# Patient Record
Sex: FEMALE | Race: BLACK OR AFRICAN AMERICAN | NOT HISPANIC OR LATINO | Employment: FULL TIME | URBAN - METROPOLITAN AREA
[De-identification: names, ages, dates, MRNs, and addresses within clinical notes are randomized per-mention and may not be internally consistent; named-entity substitution may affect disease eponyms.]

---

## 2018-04-17 ENCOUNTER — HISTORICAL (OUTPATIENT)
Dept: ADMINISTRATIVE | Facility: HOSPITAL | Age: 43
End: 2018-04-17

## 2018-04-20 ENCOUNTER — HISTORICAL (OUTPATIENT)
Dept: RADIOLOGY | Facility: HOSPITAL | Age: 43
End: 2018-04-20

## 2019-04-02 ENCOUNTER — HISTORICAL (OUTPATIENT)
Dept: ADMINISTRATIVE | Facility: HOSPITAL | Age: 44
End: 2019-04-02

## 2019-04-08 ENCOUNTER — HISTORICAL (OUTPATIENT)
Dept: RADIOLOGY | Facility: HOSPITAL | Age: 44
End: 2019-04-08

## 2019-09-20 ENCOUNTER — HISTORICAL (OUTPATIENT)
Dept: ADMINISTRATIVE | Facility: HOSPITAL | Age: 44
End: 2019-09-20

## 2019-09-20 LAB
ABS NEUT (OLG): 3.3 X10(3)/MCL (ref 2.1–9.2)
ALBUMIN SERPL-MCNC: 3.6 GM/DL (ref 3.4–5)
ALBUMIN/GLOB SERPL: 1.33 {RATIO} (ref 1.5–2.5)
ALP SERPL-CCNC: 78 UNIT/L (ref 38–126)
ALT SERPL-CCNC: 14 UNIT/L (ref 7–52)
APPEARANCE, UA: CLEAR
AST SERPL-CCNC: 14 UNIT/L (ref 15–37)
BACTERIA #/AREA URNS AUTO: NORMAL /HPF
BILIRUB SERPL-MCNC: 0.4 MG/DL (ref 0.2–1)
BILIRUB UR QL STRIP: NEGATIVE MG/DL
BILIRUBIN DIRECT+TOT PNL SERPL-MCNC: 0.1 MG/DL (ref 0–0.5)
BILIRUBIN DIRECT+TOT PNL SERPL-MCNC: 0.3 MG/DL
BUN SERPL-MCNC: 15 MG/DL (ref 7–18)
CALCIUM SERPL-MCNC: 9.1 MG/DL (ref 8.5–10)
CHLORIDE SERPL-SCNC: 107 MMOL/L (ref 98–107)
CHOLEST SERPL-MCNC: 160 MG/DL (ref 0–200)
CHOLEST/HDLC SERPL: 4.1 {RATIO}
CO2 SERPL-SCNC: 25 MMOL/L (ref 21–32)
COLOR UR: YELLOW
CREAT SERPL-MCNC: 0.87 MG/DL (ref 0.6–1.3)
ERYTHROCYTE [DISTWIDTH] IN BLOOD BY AUTOMATED COUNT: 13.5 % (ref 11.5–17)
GLOBULIN SER-MCNC: 2.7 GM/DL (ref 1.2–3)
GLUCOSE (UA): NEGATIVE MG/DL
GLUCOSE SERPL-MCNC: 115 MG/DL (ref 74–106)
HCT VFR BLD AUTO: 36.2 % (ref 37–47)
HDLC SERPL-MCNC: 39 MG/DL (ref 35–60)
HGB BLD-MCNC: 12.1 GM/DL (ref 12–16)
HGB UR QL STRIP: NEGATIVE UNIT/L
KETONES UR QL STRIP: NEGATIVE MG/DL
LDLC SERPL CALC-MCNC: 94 MG/DL (ref 0–129)
LEUKOCYTE ESTERASE UR QL STRIP: NEGATIVE UNIT/L
LYMPHOCYTES # BLD AUTO: 3.1 X10(3)/MCL (ref 0.6–3.4)
LYMPHOCYTES NFR BLD AUTO: 45.6 % (ref 13–40)
MCH RBC QN AUTO: 28.4 PG (ref 27–31.2)
MCHC RBC AUTO-ENTMCNC: 33 GM/DL (ref 32–36)
MCV RBC AUTO: 85 FL (ref 80–94)
MONOCYTES # BLD AUTO: 0.5 X10(3)/MCL (ref 0.1–1.3)
MONOCYTES NFR BLD AUTO: 7.1 % (ref 0.1–24)
NEUTROPHILS NFR BLD AUTO: 47.3 % (ref 47–80)
NITRITE UR QL STRIP.AUTO: NEGATIVE
PH UR STRIP: 6 [PH]
PLATELET # BLD AUTO: 262 X10(3)/MCL (ref 130–400)
PMV BLD AUTO: 9.2 FL (ref 9.4–12.4)
POTASSIUM SERPL-SCNC: 4.1 MMOL/L (ref 3.5–5.1)
PROT SERPL-MCNC: 6.3 GM/DL (ref 6.4–8.2)
PROT UR QL STRIP: NEGATIVE MG/DL
RBC # BLD AUTO: 4.26 X10(6)/MCL (ref 4.2–5.4)
RBC #/AREA URNS HPF: NORMAL /HPF
SODIUM SERPL-SCNC: 138 MMOL/L (ref 136–145)
SP GR UR STRIP: 1.02
SQUAMOUS EPITHELIAL, UA: NORMAL /LPF
TRIGL SERPL-MCNC: 87 MG/DL (ref 30–150)
TSH SERPL-ACNC: 3.24 MIU/ML (ref 0.35–4.94)
UROBILINOGEN UR STRIP-ACNC: 0.2 MG/DL
VLDLC SERPL CALC-MCNC: 17.4 MG/DL
WBC # SPEC AUTO: 6.9 X10(3)/MCL (ref 4.5–11.5)
WBC #/AREA URNS AUTO: NORMAL /[HPF]

## 2020-02-18 ENCOUNTER — HISTORICAL (OUTPATIENT)
Dept: ADMINISTRATIVE | Facility: HOSPITAL | Age: 45
End: 2020-02-18

## 2020-02-18 LAB
EST. AVERAGE GLUCOSE BLD GHB EST-MCNC: 123 MG/DL
HBA1C MFR BLD: 5.9 % (ref 4.4–6.4)
T3FREE SERPL-MCNC: 3.04 PG/ML (ref 1.45–3.48)
T4 FREE SERPL-MCNC: 0.96 NG/DL (ref 0.76–1.46)
TSH SERPL-ACNC: 2.25 MIU/ML (ref 0.35–4.94)

## 2020-08-19 ENCOUNTER — HISTORICAL (OUTPATIENT)
Dept: ADMINISTRATIVE | Facility: HOSPITAL | Age: 45
End: 2020-08-19

## 2020-08-19 LAB
BILIRUB SERPL-MCNC: NEGATIVE MG/DL
BLOOD URINE, POC: NORMAL
CLARITY, POC UA: NORMAL
COLOR, POC UA: NORMAL
GLUCOSE UR QL STRIP: NEGATIVE
KETONES UR QL STRIP: NEGATIVE
LEUKOCYTE EST, POC UA: NORMAL
NITRITE, POC UA: POSITIVE
PH, POC UA: 6
PROTEIN, POC: NEGATIVE
SPECIFIC GRAVITY, POC UA: 1.02
UROBILINOGEN, POC UA: NORMAL

## 2020-08-21 LAB — FINAL CULTURE: NO GROWTH

## 2020-08-25 LAB
BILIRUB SERPL-MCNC: NEGATIVE MG/DL
BLOOD URINE, POC: NEGATIVE
CLARITY, POC UA: CLEAR
COLOR, POC UA: YELLOW
GLUCOSE UR QL STRIP: NEGATIVE
KETONES UR QL STRIP: NEGATIVE
LEUKOCYTE EST, POC UA: NORMAL
NITRITE, POC UA: NEGATIVE
PH, POC UA: 6.5
PROTEIN, POC: NEGATIVE
SPECIFIC GRAVITY, POC UA: 1
UROBILINOGEN, POC UA: NORMAL

## 2021-03-01 ENCOUNTER — HISTORICAL (OUTPATIENT)
Dept: ADMINISTRATIVE | Facility: HOSPITAL | Age: 46
End: 2021-03-01

## 2021-03-01 LAB
ABS NEUT (OLG): 3 X10(3)/MCL (ref 2.1–9.2)
ALBUMIN SERPL-MCNC: 3.9 GM/DL (ref 3.4–5)
ALBUMIN/GLOB SERPL: 1.39 {RATIO} (ref 1.5–2.5)
ALP SERPL-CCNC: 79 UNIT/L (ref 38–126)
ALT SERPL-CCNC: 12 UNIT/L (ref 7–52)
AST SERPL-CCNC: 16 UNIT/L (ref 15–37)
BILIRUB SERPL-MCNC: 0.4 MG/DL (ref 0.2–1)
BILIRUBIN DIRECT+TOT PNL SERPL-MCNC: 0.1 MG/DL (ref 0–0.5)
BILIRUBIN DIRECT+TOT PNL SERPL-MCNC: 0.3 MG/DL
BUN SERPL-MCNC: 10 MG/DL (ref 7–18)
CALCIUM SERPL-MCNC: 10.2 MG/DL (ref 8.5–10)
CHLORIDE SERPL-SCNC: 104 MMOL/L (ref 98–107)
CO2 SERPL-SCNC: 24 MMOL/L (ref 21–32)
CREAT SERPL-MCNC: 0.95 MG/DL (ref 0.6–1.3)
CREAT UR-MCNC: 100 MG/DL
DEPRECATED CALCIDIOL+CALCIFEROL SERPL-MC: 19.3 NG/ML (ref 30–80)
ERYTHROCYTE [DISTWIDTH] IN BLOOD BY AUTOMATED COUNT: 13.1 % (ref 11.5–17)
EST CREAT CLEARANCE SER (OHS): 143.88 ML/MIN
EST. AVERAGE GLUCOSE BLD GHB EST-MCNC: 128 MG/DL
GLOBULIN SER-MCNC: 2.8 GM/DL (ref 1.2–3)
GLUCOSE SERPL-MCNC: 116 MG/DL (ref 74–106)
HBA1C MFR BLD: 6.1 % (ref 4.4–6.4)
HCT VFR BLD AUTO: 36.4 % (ref 37–47)
HGB BLD-MCNC: 11.8 GM/DL (ref 12–16)
LYMPHOCYTES # BLD AUTO: 3 X10(3)/MCL (ref 0.6–3.4)
LYMPHOCYTES NFR BLD AUTO: 47 % (ref 13–40)
MCH RBC QN AUTO: 27.5 PG (ref 27–31.2)
MCHC RBC AUTO-ENTMCNC: 32 GM/DL (ref 32–36)
MCV RBC AUTO: 85 FL (ref 80–94)
MICROALBUMIN UR-MCNC: 10 MG/L
MICROALBUMIN/CREAT RATIO PNL UR: <30 MG/GM
MONOCYTES # BLD AUTO: 0.4 X10(3)/MCL (ref 0.1–1.3)
MONOCYTES NFR BLD AUTO: 7 % (ref 0.1–24)
NEUTROPHILS NFR BLD AUTO: 46 % (ref 47–80)
PLATELET # BLD AUTO: 314 X10(3)/MCL (ref 130–400)
PMV BLD AUTO: 9.4 FL (ref 9.4–12.4)
POTASSIUM SERPL-SCNC: 4.6 MMOL/L (ref 3.5–5.1)
PROT SERPL-MCNC: 6.7 GM/DL (ref 6.4–8.2)
RBC # BLD AUTO: 4.29 X10(6)/MCL (ref 4.2–5.4)
SODIUM SERPL-SCNC: 136 MMOL/L (ref 136–145)
T3FREE SERPL-MCNC: 2.93 PG/ML (ref 1.45–3.48)
T4 FREE SERPL-MCNC: 1.05 NG/DL (ref 0.76–1.46)
TSH SERPL-ACNC: 2.77 MIU/ML (ref 0.35–4.94)
WBC # SPEC AUTO: 6.4 X10(3)/MCL (ref 4.5–11.5)

## 2021-03-30 ENCOUNTER — HISTORICAL (OUTPATIENT)
Dept: ADMINISTRATIVE | Facility: HOSPITAL | Age: 46
End: 2021-03-30

## 2022-02-22 ENCOUNTER — HISTORICAL (OUTPATIENT)
Dept: ADMINISTRATIVE | Facility: HOSPITAL | Age: 47
End: 2022-02-22

## 2022-02-22 LAB
PTH-INTACT SERPL-MCNC: 263.4 PG/ML (ref 8.7–77.1)
RHEUMATOID FACT SERPL-ACNC: <13 [IU]/ML

## 2022-02-23 LAB
ANTINUCLEAR ANTIBODY SCREEN (OHS): NEGATIVE
DSDNA AB QUANT (OHS): 2.1
DSDNA ANTIBODY (OHS): NEGATIVE

## 2022-04-10 ENCOUNTER — HISTORICAL (OUTPATIENT)
Dept: ADMINISTRATIVE | Facility: HOSPITAL | Age: 47
End: 2022-04-10
Payer: MEDICAID

## 2022-04-26 VITALS
OXYGEN SATURATION: 97 % | SYSTOLIC BLOOD PRESSURE: 149 MMHG | HEIGHT: 62 IN | DIASTOLIC BLOOD PRESSURE: 83 MMHG | BODY MASS INDEX: 51.48 KG/M2 | WEIGHT: 279.75 LBS

## 2022-05-02 NOTE — HISTORICAL [facility]
This is a historical note converted from [employer]. Formatting and pictures may have been removed.  Please reference [employer] for original formatting and attached multimedia. Chief Complaint  Frequent/painful urination x1 week, H/o recurring UTIs, lower back pain, used AZO cranberry for urinary symptoms 8/18/20  History of Present Illness  44 year old  female presents with urinary frequency, back pain and changes in urinary stream x1 week?. No fever or chills. Denies N/V/D. No genital irritation or vaginal discharge reported. Denies flank pain or blood in urine.  Hx of Total Hysterectomy. Recurrent yeast infections and BV in past.  Review of Systems  ?  CONSTITUTIONAL:?No?fever, chills, weakness or fatigue.?  CARDIOVASCULAR: No chest pain, palpitations or edema.?  RESPIRATORY: No shortness of breath, coughing or wheezing.  GASTROINTESTINAL: No anorexia, nausea, vomiting or diarrhea. No abdominal pain.  GENITOURINARY:?+urinary frequency.?+ pain with urination; no blood in urine. No vaginal?discharge.  NEUROLOGICAL: No headache, dizziness, syncope, paralysis, ataxia, numbness or tingling in the extremities.  MUSCULOSKELETAL: Low back pain.  SKIN: No rash or itching.?  ?   ?  ?  Physical Exam  Vitals & Measurements  T:?36.4? ?C (Oral)? HR:?71(Peripheral)? RR:?20? BP:?140/83? SpO2:?100%?  HT:?158.00?cm? WT:?125.200?kg? BMI:?50.15?  Vital Signs reviewed  CV: Normal rate and rhythm. No murmurs, rubs or gallops.??Normal peripheral pulses and perfusion.  RESP: Respirations even and non labored. BBS clear to auscultation. No accessory muscle use.  GI: Abdomen soft and non distended. No tenderness with palpation.  MUSCULOSKELETAL: Full passive and active ROM of all joints. No bony deformities,?joint tenderness or swelling.?No CVA tenderness.  NEURO: Awake, alert and oriented to person, place, time and situation. No focal or sensory deficits noted.  ?  Assessment/Plan  1.?UTI (urinary tract infection)?N39.0,?  Urine  Dipstick with?trace? blood, small?leukocytes, positive nitrite. ?Urine C&S pending.  Rx for Macrobid 100 mg PO BID x 7 days; Pyridium 200 mg PO TID x 3 days.  Fluconazole 150 mg tablet at completion of antibiotics x 1 dose.  Plenty of water.  Always wipe front to back after voiding or BMs.  ER precautions for fever, N/V, hematuria or worsening in condition.  UTI (urinary tract infection)?N39.0  Ordered:  fluconazole, See Instructions, 1 tab(s) Oral at completion of antibiotics, # 1 tab(s), 0 Refill(s), Pharmacy: [facility] #[id number], 158, cm, Height/Length Dosing, 08/19/20 13:19:00 CDT, 125.2, kg, Weight Dosing, 08/19/20 13:19:00 CDT  nitrofurantoin, 100 mg = 1 cap(s), Oral, BID, X 7 day(s), # 14 cap(s), 0 Refill(s), Pharmacy: [facility] #[id number], 158, cm, Height/Length Dosing, 08/19/20 13:19:00 CDT, 125.2, kg, Weight Dosing, 08/19/20 13:19:00 CDT  Office/Outpatient Visit Level 3 New [id number] PC, UTI (urinary tract infection)  Morbid obesity, 08/19/20 14:26:00 CDT  Urine Culture [id number], Routine collect, 08/19/20 14:12:00 CDT, Urine, Clean Catch, Nurse collect, Stop date 08/19/20 14:13:00 CDT, UTI (urinary tract infection)  ?  2.?Morbid obesity?E66.01  PCP follow up for routine health management.  Ordered:  Office/Outpatient Visit Level 3 New [id number] PC, UTI (urinary tract infection)  Morbid obesity, 08/19/20 14:26:00 CDT  ?   Problem List/Past Medical History  Ongoing  Acute medial meniscal injury of right knee  Chondromalacia patellae, right knee  Iliotibial band syndrome, right leg  Left knee pain  Morbid obesity  Prepatellar bursitis, right knee  Sacroiliitis  Sprain of medial collateral ligament of left knee  Tobacco user  Historical  No qualifying data  Procedure/Surgical History  Colonoscopy (12/19/2019)  Colonoscopy (10/26/2012)  [place] teeth extracted (2008)  History of total vaginal hysterectomy (TVH) (2004)   Medications  Astelin 137 mcg/inh nasal spray, 2 spray(s), Nasal, BID, 5  refills  fluconazole 150 mg oral tablet, See Instructions  levothyroxine 75 mcg (0.075 mg) oral tablet, 75 mcg= 1 tab(s), Oral, Daily, 5 refills  Macrobid 100 mg oral capsule, 100 mg= 1 cap(s), Oral, BID  Pyridium 200 mg oral tablet, 200 mg= 1 tab(s), Oral, TID  Allergies  No Known Allergies  Social History  Abuse/Neglect  No, No, Yes, 08/19/2020  Alcohol  Never, 04/17/2018  Employment/School  Unemployed, 08/27/2019  Home/Environment  Lives with Children., 08/27/2019  Tobacco  Former smoker, quit more than 30 days ago, N/A, 08/19/2020  Family History  Alzheimers disease: Father.  Asthma.: Mother.  Diabetes mellitus type 2: Mother.  Hypertension.: Mother.  Hyperthyroidism.: Sister.  Thyroid disease: Mother.  Sister: History is negative  Sister: History is negative  Immunizations  Vaccine Date Status   influenza virus vaccine, inactivated 09/25/2019 Given   Health Maintenance  Health Maintenance  ???Pending?(in the next year)  ??? ??OverDue  ??? ? ? ?Cervical Cancer Screening due??04/30/17??and every 3??year(s)  ??? ? ? ?Depression Screening due??04/17/19??and every 1??year(s)  ??? ??Due?  ??? ? ? ?Influenza Vaccine due??08/19/20??and every?  ??? ? ? ?Tetanus Vaccine due??08/19/20??and every 10??year(s)  ??? ??Refused?  ??? ? ? ?Smoking Cessation due??01/01/20??and every 1??year(s)  ??? ??Due In Future?  ??? ? ? ?Obesity Screening not due until??01/01/21??and every 1??year(s)  ??? ? ? ?Alcohol Misuse Screening not due until??01/02/21??and every 1??year(s)  ??? ? ? ?Diabetes Screening not due until??02/17/21??and every 1??year(s)  ???Satisfied?(in the past 1 year)  ??? ??Satisfied?  ??? ? ? ?ADL Screening on??08/19/20.??Satisfied by Yousuf Kim  ??? ? ? ?Alcohol Misuse Screening on??08/19/20.??Satisfied by Deanne Larose LPN.  ??? ? ? ?Blood Pressure Screening on??08/19/20.??Satisfied by Yousuf Kim  ??? ? ? ?Body Mass Index Check on??08/19/20.??Satisfied by Yousuf Kim  ??? ? ? ?Diabetes  Screening on??02/18/20.??Satisfied by Sussy Dill  ??? ? ? ?Influenza Vaccine on??09/25/19.??Satisfied by Lana Choudhary LPN  ??? ? ? ?Lipid Screening on??09/20/19.??Satisfied by Robert Fajardo  ??? ? ? ?Obesity Screening on??08/19/20.??Satisfied by Yousuf Kim  ??? ??Refused?  ??? ? ? ?Smoking Cessation on??08/19/20.??Recorded by Deanne Larose LPN??Reason: Patient Refuses  ?  Lab Results  Test Name Test Result Date/Time   Urine Color Urine Dipstick Dark yellow 08/19/2020 13:20 CDT   Urine Appearance Urine Dipstick Slightly cloudy 08/19/2020 13:20 CDT   pH Urine Dipstick 6 08/19/2020 13:20 CDT   Specific Gravity Urine Dipstick 1.020 08/19/2020 13:20 CDT   Blood Urine Dipstick Trace 08/19/2020 13:20 CDT   Glucose Urine Dipstick Negative 08/19/2020 13:20 CDT   Ketones Urine Dipstick Negative 08/19/2020 13:20 CDT   Protein Urine Dipstick Negative 08/19/2020 13:20 CDT   Bilirubin Urine Dipstick Negative 08/19/2020 13:20 CDT   Urobilinogen Urine Dipstick 0.2 mg/dl 08/19/2020 13:20 CDT   Leukocytes Urine Dipstick 1+ Small 08/19/2020 13:20 CDT   Nitrite Urine Dipstick Positive 08/19/2020 13:20 CDT

## 2022-05-02 NOTE — HISTORICAL [facility]
This is a historical note converted from [employer]. Formatting and pictures may have been removed.  Please reference La for original formatting and attached multimedia. Chief Complaint  6 MONTH RECHECK/CCC  History of Present Illness  Patient presents for her 6-month follow-up. CCC for 6 days.  ?  Wasnt?able to keep appt with dietician.? Has modified diet somewhat.  Colonoscopy showed 3 polyps 12-19-19 with Dr Shelley, repeat due 2024. He took her off of NSAID, she is taking Tylenol and glucosamine chondroitin.  Has cut back on nicotine.  Review of Systems  General:??????????????? Patient reports energy level is good.??Denies fever,chills, night sweats, fatigue or weakness.  HEENT:?????????????????? Denies vision changes or eye pain.?  Cardiovascular:??? Denies chest pain, palpitations, dyspnea on exertion, orthopnea.  Respiratory:???????? No cough, wheezing, shortness of breath, or sputum.  GI:????????????????????????? Denies nausea, emesis, constipation, diarrhea, melena, hematochezia or abdominal pain  :??????????????????????? No frequency, urgency, hematuria, discharge, or incontinence  MS:?????????????????????? Denies myalgias, arthralgias, joint effusion, edema, or weakness  Neuro:????????????????? No headaches, numbness in extremities, tingling, dizziness, or weakness  Psych:?????????????????? Denies anxiety, depression, suicidal or homicidal ideations, or irritability  Endo:??????????????????? Denies polyuria, polydipsia, polyphagia  Heme:????????????????? No abnormal bleeding or bruising. No lymph node enlargement or pain.  Physical Exam  Vitals & Measurements  T:?37? ?C (Oral)? HR:?94(Peripheral)? BP:?139/80?  HT:?160?cm? WT:?119.7?kg? BMI:?46.76?  General :?????Well-developed and? nourished, no apparent distress, alert and oriented ?4  HEENT:????? TMs and EACs within normal limits,?nasal mucosa with erythema and clear dischare. OP with erythema but no exudate.  Integument :????? Skin is intact with no  erythema.? No pustules or vesicles.? No rash or scale. No Lymphadenopathy.? No urticaria.? No abnormal nevi  Neck :?????Supple, no lymphadenopathy, no thyromegaly, no bruits, no jugular venous distention  Cardiovascular :?????? Regular rhythm and rate, no murmurs, radial and dorsal pedal pulses 2+ bilaterally  Respiratory :??????Lungs clear to auscultation bilaterally, no wheezes, no crackles, no rhonchi.? Good air movement  Abdomen :?????? ?NABS, soft, nontender, no hepatosplenomegaly, no masses, no guarding or rebound????????????????????????  Ext:??????? No muscle tenderness, joints WNL, FROM, negative SLR, no?CCE  Neuro :? ?????? CN II-XII intact, reflexes 2+ throughout, no motor sensory deficits, negative?cerebellar? tests  Heme :?????? No bruising or petechiae?  Back:??????? no CVAT  Assessment/Plan  1.?Hypothyroid?E03.9  ?We will check a TSH, FT3, and FT4 today. ?He has had no side effects to?levothyroxine. ?Will refill med after lab returns.  2.?Sciatica?M54.30  ?Patient doing well with as needed Tylenol.  3.?Morbid obesity?E66.01  ?Long discussion concerning diet and?need for weight loss.? She will?reschedule her appointment with dietitian.  4.?Sinusitis?J32.9  ?We will administer 2 cc Celestone. ?Prescribed Astelin nasal spray,?Promethazine DM,?and?10 days of Ceftin to use if symptoms fail to improve.  5.?Elevated glucose?R73.09  ?We will check an A1c  Referrals  Clinic Follow-up PRN, 02/18/20 9:09:00 CST, [facility], Future Order   Problem List/Past Medical History  Ongoing  Acute medial meniscal injury of right knee  Chondromalacia patellae, right knee  Iliotibial band syndrome, right leg  Left knee pain  Morbid obesity  Prepatellar bursitis, right knee  Sacroiliitis  Sprain of medial collateral ligament of left knee  Tobacco user  Historical  No qualifying data  Procedure/Surgical History  Colonoscopy (12/19/2019)  Colonoscopy (10/26/2012)  [place] teeth extracted (2008)  History of total vaginal  hysterectomy (TVH) (2004)   Medications  Astelin 137 mcg/inh nasal spray, 2 spray(s), Nasal, BID, 5 refills  cefuroxime 250 mg oral tablet, 250 mg= 1 tab(s), Oral, BID  levothyroxine 75 mcg (0.075 mg) oral tablet, 75 mcg= 1 tab(s), Oral, Daily, 5 refills  Promethazine DM 6.25 mg-15 mg/5 mL oral syrup, 5 mL, Oral, q6hr, PRN  Allergies  No Known Allergies  Social History  Abuse/Neglect  No, 02/18/2020  No, 09/25/2019  No, 08/28/2019  Alcohol  Never, 04/17/2018  Employment/School  Unemployed, 08/27/2019  Home/Environment  Lives with Children., 08/27/2019  Tobacco  Smoker, current status unknown, N/A, 02/18/2020  Smoker, current status unknown, Yes, 09/25/2019  Former smoker, quit more than 30 days ago, N/A, 08/28/2019  Current every day smoker, Cigarettes, 5 per day., 02/21/2018  Family History  Alzheimers disease: Father.  Asthma.: Mother.  Diabetes mellitus type 2: Mother.  Hypertension.: Mother.  Hyperthyroidism.: Sister.  Thyroid disease: Mother.  Sister: History is negative  Sister: History is negative  Immunizations  Vaccine Date Status   influenza virus vaccine, inactivated 09/25/2019 Given   Health Maintenance  Health Maintenance  ???Pending?(in the next year)  ??? ??OverDue  ??? ? ? ?Diabetes Screening due??and every?  ??? ? ? ?Cervical Cancer Screening due??04/30/17??and every 3??year(s)  ??? ? ? ?Depression Screening due??04/17/19??and every 1??year(s)  ??? ? ? ?Smoking Cessation due??01/01/20??and every 1??year(s)  ??? ??Due?  ??? ? ? ?Alcohol Misuse Screening due??01/01/20??and every 1??year(s)  ??? ? ? ?ADL Screening due??02/25/20??and every 1??year(s)  ??? ? ? ?Tetanus Vaccine due??02/25/20??and every 10??year(s)  ??? ??Due In Future?  ??? ? ? ?Obesity Screening not due until??01/01/21??and every 1??year(s)  ??? ? ? ?Blood Pressure Screening not due until??02/17/21??and every 1??year(s)  ??? ? ? ?Body Mass Index Check not due until??02/17/21??and every 1??year(s)  ???Satisfied?(in the past 1 year)  ???  ??Satisfied?  ??? ? ? ?Blood Pressure Screening on??02/18/20.??Satisfied by Nubia Young CMA  ??? ? ? ?Body Mass Index Check on??02/18/20.??Satisfied by Nubia Young CMA.  ??? ? ? ?Diabetes Screening on??02/18/20.??Satisfied by Sussy Dill  ??? ? ? ?Influenza Vaccine on??09/25/19.??Satisfied by Lana Choudhary LPN  ??? ? ? ?Lipid Screening on??09/20/19.??Satisfied by Robert Fajardo  ??? ? ? ?Obesity Screening on??02/18/20.??Satisfied by Nubia Young CMA  ?

## 2022-05-02 NOTE — HISTORICAL [facility]
This is a historical note converted from La. Formatting and pictures may have been removed.  Please reference La for original formatting and attached multimedia. Chief Complaint  right knee pain- 12/2017 patient fell at [employer] and has had pain since  History of Present Illness  This 42-year-old comes in for her right knee and SI joint.? The patient states that she fell in December.? Since that time she has developed a limp?and SI joint problems.? She also gives a description of swelling and true mechanical locking of her right knee.  Review of Systems  Constitutional: No fever, weakness, or fatigue.  Ear/Nose/Mouth/Throat: No nasal congestion or sore throat.  Respiratory: No shortness of breath or cough.  Cardiovascular: No chest pain, palpitations, or peripheral edema.  Gastrointestinal: No nausea, vomiting, or abdominal pain.  Genitourinary: No dysuria.  Musculoskeletal: See current complaints  Integumentary: Negative.  Physical Exam  Vitals & Measurements  HR:?67(Peripheral)? BP:?139/99?  HT:?160?cm? HT:?160?cm? WT:?113.7?kg? WT:?113.7?kg? BMI:?44.41?  Physical examination shows that the patient is primarily tender on her right iliotibial band. ?She has some tenderness at the trochanteric bursa. ?She has no true SI joint pain. ?She is motor and sensory intact.? She has a mildly positive Fabers test and a mildly positive tightness of her iliopsoas as well.? She has no evidence of?radiculopathy.  ?  Examination of her right knee shows that the patient has obvious prepatellar bursitis.? She also has a small joint effusion.? She has a positive medial McMurrays test with provocative testing.? She has no evidence of ligamentous laxity. ?She has good patella tracking.? Range of motion is 5-115?.? She is motor and sensory intact.  ?  Standing AP, lateral, and sunrise view of the right knee shows that the patient has mild narrowing of the medial compartment of the right knee.? Otherwise her x-rays are  negative.  Assessment/Plan  1.?Right knee pain  ? The findings were reviewed with the patient and she expressed understanding. ?I recommended the patient have an MRI of her right knee. ?I believe that her persistent limping is causing her to have right SI joint and iliotibial band trouble.? I will see her back after her MRI is completed.? She is instructed to call if she has any problems prior to her next appointment.  Ordered:  Clinic Follow up, *Est. 04/24/18 3:00:00 CDT, Order for future visit, Right knee pain  Prepatellar bursitis, right knee  Acute medial meniscal injury of right knee  Iliotibial band syndrome, right leg, [facility] [place]  MRI Ext Lower Joint Right W/O Contrast, Routine, *Est. 04/18/18 3:00:00 CDT, Injury, knee & below, None, Ambulatory, Rad Type, Order for future visit, Right knee pain  Prepatellar bursitis, right knee  Acute medial meniscal injury of right knee  Iliotibial band syndrome, right leg, Sched...  Office/Outpatient Visit Level 3 New [id number] PC, Right knee pain  Prepatellar bursitis, right knee  Acute medial meniscal injury of right knee  Iliotibial band syndrome, right leg, [facility] [place], 04/17/18 13:57:00 CDT  XR Knee Right 3 Views, Routine, 04/17/18 13:30:00 CDT, Pain, None, Ambulatory, Rad Type, Right knee pain, Not Scheduled, 04/17/18 13:30:00 CDT  ?  2.?Prepatellar bursitis, right knee  Ordered:  Clinic Follow up, *Est. 04/24/18 3:00:00 CDT, Order for future visit, Right knee pain  Prepatellar bursitis, right knee  Acute medial meniscal injury of right knee  Iliotibial band syndrome, right leg, [facility] [place]  MRI Ext Lower Joint Right W/O Contrast, Routine, *Est. 04/18/18 3:00:00 CDT, Injury, knee & below, None, Ambulatory, Rad Type, Order for future visit, Right knee pain  Prepatellar bursitis, right knee  Acute medial meniscal injury of right knee  Iliotibial band syndrome, right leg, Sched...  Office/Outpatient Visit Level 3 New [id number] PC, Right knee  pain  Prepatellar bursitis, right knee  Acute medial meniscal injury of right knee  Iliotibial band syndrome, right leg, [facility] [place], 04/17/18 13:57:00 CDT  ?  3.?Acute medial meniscal injury of right knee  Ordered:  Clinic Follow up, *Est. 04/24/18 3:00:00 CDT, Order for future visit, Right knee pain  Prepatellar bursitis, right knee  Acute medial meniscal injury of right knee  Iliotibial band syndrome, right leg, [facility] [place]  MRI Ext Lower Joint Right W/O Contrast, Routine, *Est. 04/18/18 3:00:00 CDT, Injury, knee & below, None, Ambulatory, Rad Type, Order for future visit, Right knee pain  Prepatellar bursitis, right knee  Acute medial meniscal injury of right knee  Iliotibial band syndrome, right leg, Sched...  Office/Outpatient Visit Level 3 New [id number] PC, Right knee pain  Prepatellar bursitis, right knee  Acute medial meniscal injury of right knee  Iliotibial band syndrome, right leg, [facility] [place], 04/17/18 13:57:00 CDT  ?  4.?Iliotibial band syndrome, right leg  Ordered:  Clinic Follow up, *Est. 04/24/18 3:00:00 CDT, Order for future visit, Right knee pain  Prepatellar bursitis, right knee  Acute medial meniscal injury of right knee  Iliotibial band syndrome, right leg, [facility] [place]  MRI Ext Lower Joint Right W/O Contrast, Routine, *Est. 04/18/18 3:00:00 CDT, Injury, knee & below, None, Ambulatory, Rad Type, Order for future visit, Right knee pain  Prepatellar bursitis, right knee  Acute medial meniscal injury of right knee  Iliotibial band syndrome, right leg, Sched...  Office/Outpatient Visit Level 3 New [id number] PC, Right knee pain  Prepatellar bursitis, right knee  Acute medial meniscal injury of right knee  Iliotibial band syndrome, right leg, [facility] [place], 04/17/18 13:57:00 CDT  ?   Problem List/Past Medical History  Ongoing  Acute medial meniscal injury of right knee  Iliotibial band syndrome, right leg  Morbid obesity  Prepatellar bursitis,  right knee  Tobacco user  Historical  No qualifying data  Medications  Flexeril 5 mg oral tablet, 5 mg= 1 tab(s), Oral, TID  Allergies  No Known Allergies  Social History  Alcohol  Never, 04/17/2018  Tobacco  Current every day smoker, Cigarettes, 5 per day., 02/21/2018

## 2022-05-02 NOTE — HISTORICAL [facility]
This is a historical note converted from [place]. Formatting and pictures may have been removed.  Please reference [place] for original formatting and attached multimedia. Chief Complaint  Left knee pain. Pt states its like a popping pain.  History of Present Illness  This 43-year-old comes in complaining of left knee pain.? I saw her last year for right knee pain and?sacroiliitis.? She had scheduled for?a right SI joint injection but never returned for paperwork or injection.? She states that she still has some right knee pain as well as right SI joint pain but she has learned to live with it.? She gives a good description of meniscal pathology on the left knee.  Review of Systems  Constitutional: No fever, weakness, or fatigue.  Ear/Nose/Mouth/Throat: No nasal congestion or sore throat.  Respiratory: No shortness of breath or cough.  Cardiovascular: No chest pain, palpitations, or peripheral edema.  Gastrointestinal: No nausea, vomiting, or abdominal pain.  Genitourinary: No dysuria.  Musculoskeletal: See current complaints; multiple joint complaints  Integumentary: Negative.  Physical Exam  Vitals & Measurements  HR:?64(Peripheral)? BP:?128/80?  HT:?160?cm? WT:?113.7?kg? BMI:?44.41?  Physical examination shows that the patient walks with an antalgic gait.? She has a moderate effusion in her left knee. ?She is exquisitely tender on the medial joint line of her left knee.? She has a positive medial McMurrays test. ?She has no evidence of ligamentous laxity.? She has good patella tracking. ?She has mild patellofemoral crepitance.? She is motor and sensory intact.  ?  Standing AP, lateral,?sunrise view?and?tunnel view of the left knee shows that?the patient has mild narrowing of the medial compartment of both knees. ?These are KL grade 2 changes.? She has subchondral sclerosis in the medial tibial plateau bilaterally and a small medial femoral osteophyte on the right.  Assessment/Plan  1.?Derangement of medial meniscus  of left knee  ? The findings were reviewed with the patient and she expressed understanding.? We discussed options for treatment. ?The patient would like an MRI to rule out meniscal pathology.? I will see her back after her MRI is completed.? She is instructed to call if she has any problems prior to her next appointment.  Ordered:  1034F Current tobacco smoker:, 04/02/19 16:12:00 CDT  1125F Pain severity quantified, pain present, 04/02/19 16:12:00 CDT  3008F Body Mass Index (BMI), documented, 04/02/19 16:12:00 CDT  3074F Most recent systolic blood pressure, less than 130 mm Hg, 04/02/19 16:12:00 CDT  3079F Most recent diastolic blood pressure, 80 - 89 mm Hg, 04/02/19 16:12:00 CDT  Clinic Follow up, *Est. 04/09/19 3:00:00 CDT, Order for future visit, Derangement of medial meniscus of left knee  Left knee pain, Orthopaedics  MRI Ext Lower Joint Left W/O Contrast, Routine, *Est. 04/08/19 15:45:00 CDT, Injury, knee & below, M23.304 & M25.562; Other meniscus derangements, unspecified medial meniscus, left knee & Pain in left knee, None, Ambulatory, Rad Type, Order for future visit, Derangement of medial meniscus...  Office/Outpatient Visit Level 3 Established [facility], Derangement of medial meniscus of left knee  Left knee pain, [facility], 04/02/19 16:12:00 CDT  ?  2.?Left knee pain  Ordered:  1034F Current tobacco smoker:, 04/02/19 16:12:00 CDT  1125F Pain severity quantified, pain present, 04/02/19 16:12:00 CDT  3008F Body Mass Index (BMI), documented, 04/02/19 16:12:00 CDT  3074F Most recent systolic blood pressure, less than 130 mm Hg, 04/02/19 16:12:00 CDT  3079F Most recent diastolic blood pressure, 80 - 89 mm Hg, 04/02/19 16:12:00 CDT  Clinic Follow up, *Est. 04/09/19 3:00:00 CDT, Order for future visit, Derangement of medial meniscus of left knee  Left knee pain, [facility]  MRI Ext Lower Joint Left W/O Contrast, Routine, *Est. 04/08/19 15:45:00 CDT, Injury, knee & below, M23.304 & M25.562;  Other meniscus derangements, unspecified medial meniscus, left knee & Pain in left knee, None, Ambulatory, Rad Type, Order for future visit, Derangement of medial meniscus...  Office/Outpatient Visit Level 3 Established [facility], Derangement of medial meniscus of left knee  Left knee pain, [facility] Clinic, 04/02/19 16:12:00 CDT  ?   Problem List/Past Medical History  Ongoing  Acute medial meniscal injury of right knee  Chondromalacia patellae, right knee  Iliotibial band syndrome, right leg  Left knee pain  Morbid obesity  Prepatellar bursitis, right knee  Sacroiliitis  Tobacco user  Historical  No qualifying data  Medications  No active medications  Allergies  No Known Allergies  Social History  Alcohol  Never, 04/17/2018  Tobacco  5-9 cigarettes (between 1/4 to 1/2 pack)/day in last 30 days, No, 04/02/2019  Current every day smoker, Cigarettes, 5 per day., 02/21/2018  Health Maintenance  Health Maintenance  ???Pending?(in the next year)  ??? ??OverDue  ??? ? ? ?Cervical Cancer Screening due??04/30/17??and every 3??year(s)  ??? ??Due?  ??? ? ? ?ADL Screening due??04/03/19??and every 1??year(s)  ??? ? ? ?Alcohol Misuse Screening due??04/03/19??and every 1??year(s)  ??? ? ? ?Diabetes Screening due??04/03/19??and every?  ??? ? ? ?Lipid Screening due??04/03/19??and every?  ??? ? ? ?Tetanus Vaccine due??04/03/19??and every 10??year(s)  ??? ??Due In Future?  ??? ? ? ?Depression Screening not due until??04/17/19??and every 1??year(s)  ??? ? ? ?Smoking Cessation not due until??05/23/19??and every 1??year(s)  ??? ? ? ?Blood Pressure Screening not due until??04/01/20??and every 1??year(s)  ??? ? ? ?Body Mass Index Check not due until??04/01/20??and every 1??year(s)  ??? ? ? ?Obesity Screening not due until??04/02/20??and every 1??year(s)  ???Satisfied?(in the past 1 year)  ??? ??Satisfied?  ??? ? ? ?Blood Pressure Screening on??04/02/19.??Satisfied by Colette Moncada  ??? ? ? ?Body Mass Index Check  on??04/02/19.??Satisfied by Colette Moncada  ??? ? ? ?Depression Screening on??04/17/18.??Satisfied by Shreya Danielson  ??? ? ? ?Obesity Screening on??04/02/19.??Satisfied by Colette Moncada  ??? ? ? ?Smoking Cessation on??05/23/18.??Satisfied by Ayden Nance  ?  ?

## 2022-05-02 NOTE — HISTORICAL [facility]
This is a historical note converted from [place]. Formatting and pictures may have been removed.  Please reference [place] for original formatting and attached multimedia. Chief Complaint  BACK PAIN  History of Present Illness  Patient with low back pain for 2 weeks. Radiates to legs, burns more on right side.  also gets pain in neck.  Has tried Tylenol Arthritis with only partial help.  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  Review of Systems  General:??????????????? Patient reports energy level is fair.??Denies fever,chills, night sweats, fatigue or weakness.  HEENT:?????????????????? Denies vision changes or eye pain.? No sore throat, ear pain, sinus pressure or discharge.  Cardiovascular:??? Denies chest pain, palpitations, dyspnea on exertion, orthopnea.  Respiratory:???????? No cough, wheezing, shortness of breath, or sputum.  GI:????????????????????????? Denies nausea, emesis, constipation, diarrhea, melena, hematochezia or abdominal pain  :??????????????????????? No frequency, urgency, hematuria, discharge, or incontinence  MS:?????????????????????? See HPI  Neuro:????????????????? See HPI, no weakness in legs.  Psych:?????????????????? Denies anxiety, depression, suicidal or homicidal ideations, or irritability  Endo:??????????????????? Denies polyuria, polydipsia, polyphagia  Heme:????????????????? No abnormal bleeding or bruising. No lymph node enlargement or pain.  Physical Exam  Vitals & Measurements  HR:?89(Peripheral)? BP:?149/83?  HT:?158?cm? HT:?158.00?cm? WT:?126.9?kg? WT:?126.900?kg? BMI:?50.83?  General: Patient is alert and oriented, morbidly obese  Neck:?Trapezius muscle spasm, tender with range of motion.??No LAD, no masses, no carotid bruits  CV:?Regular rate and rhythm, no murmur  Lungs: CTA B  ABD: Benign  Back:?Lumbar muscle spasm, tender with range of motion.  Neuro:?Intact bilateral lower extremities, negative straight leg raise today.  EXT: tender in each hip with external  rotation.? 2+ DP and radial pulses bilaterally. ?No CCE. ?No LAD.  ?  XR lumbar spine: Normal  XR left hip: OA changes  XR right hip: OA changes  XR cervical spine: Mild loss of lordotic curve, no significant disc disease appreciated  Assessment/Plan  1.?Sciatica?M54.30  Prescribe meloxicam 15 mg, Flexeril 5 mg, and physical therapy  2.?Hip pain?M25.559  3.?Neck pain?M54.2  4.?Morbid obesity?E66.01  ?Patient agrees to a dietitian referral  5.?Prediabetes?R73.03  ?See #4.  Referrals  External Referral, Dietary Counseling, Dietician, Barbara Shi, 03/30/21 15:54:00 CDT, Morbid obesity  Prediabetes  Clinic Follow-up PRN, 03/30/21 15:57:00 CDT, [facility], Future Order   Problem List/Past Medical History  Ongoing  Acute medial meniscal injury of right knee  Chondromalacia patellae, right knee  Iliotibial band syndrome, right leg  Left knee pain  Morbid obesity  Prepatellar bursitis, right knee  Sacroiliitis  Sprain of medial collateral ligament of left knee  Tobacco user  Historical  No qualifying data  Procedure/Surgical History  Colonoscopy (12/19/2019)  Colonoscopy (10/26/2012)  [place] teeth extracted (2008)  History of total vaginal hysterectomy (TVH) (2004)  corpal tunnel sx x 2  hysterectomy  wisdom teeth extraction   Medications  Astelin 137 mcg/inh nasal spray, 2 spray(s), Nasal, BID, 5 refills  Flexeril 5 mg oral tablet, See Instructions, 1 refills  levothyroxine 75 mcg (0.075 mg) oral tablet, 75 mcg= 1 tab(s), Oral, Daily, 1 refills  meloxicam 15 mg oral tablet, 15 mg= 1 tab(s), Oral, Daily, 5 refills  Physical Therapy, See Instructions  Vitamin D3 50,000 intl units (1250 mcg) oral capsule, 85998 IntUnit= 1 cap(s), Oral, qWeek, 2 refills  Allergies  No Known Allergies  Social History  Abuse/Neglect  No, 03/30/2021  No, 03/01/2021  No, 08/25/2020  Alcohol  Never, 04/17/2018  Employment/School  Unemployed, 08/27/2019  Home/Environment  Lives with Children., 08/27/2019  Tobacco  Former smoker, quit more  than 30 days ago, No, 03/30/2021  Former smoker, quit more than 30 days ago, No, 03/01/2021  Former smoker, quit more than 30 days ago, No, 08/25/2020  Family History  Alzheimers disease: Father.  Asthma.: Mother.  Diabetes mellitus type 2: Mother.  Hypertension.: Mother.  Hyperthyroidism.: Sister.  Thyroid disease: Mother.  Sister: History is negative  Sister: History is negative  Immunizations  Vaccine Date Status   COVID-19 MRNA, LNP-S, PF- Pfizer 03/16/2021 Given   influenza virus vaccine, inactivated 09/25/2019 Given   Health Maintenance  Health Maintenance  ???Pending?(in the next year)  ??? ??OverDue  ??? ? ? ?Cervical Cancer Screening due??04/30/17??and every 3??year(s)  ??? ? ? ?Depression Screening due??04/17/19??and every 1??year(s)  ??? ? ? ?Influenza Vaccine due??10/01/20??and every 1??day(s)  ??? ? ? ?Alcohol Misuse Screening due??01/02/21??and every 1??year(s)  ??? ??Due?  ??? ? ? ?Tetanus Vaccine due??04/05/21??and every 10??year(s)  ??? ??Refused?  ??? ? ? ?Smoking Cessation due??01/01/21??and every 1??year(s)  ??? ??Due In Future?  ??? ? ? ?ADL Screening not due until??08/19/21??and every 1??year(s)  ??? ? ? ?Obesity Screening not due until??01/01/22??and every 1??year(s)  ??? ? ? ?Diabetes Screening not due until??03/01/22??and every 1??year(s)  ??? ? ? ?Blood Pressure Screening not due until??03/30/22??and every 1??year(s)  ??? ? ? ?Body Mass Index Check not due until??03/30/22??and every 1??year(s)  ???Satisfied?(in the past 1 year)  ??? ??Satisfied?  ??? ? ? ?ADL Screening on??08/19/20.??Satisfied by Yousuf Kim  ??? ? ? ?Alcohol Misuse Screening on??08/19/20.??Satisfied by Deanne Larose LPN.  ??? ? ? ?Blood Pressure Screening on??03/30/21.??Satisfied by Nubia Young CMA  ??? ? ? ?Body Mass Index Check on??03/30/21.??Satisfied by Nubia Young CMA  ??? ? ? ?Diabetes Screening on??03/01/21.??Satisfied by Shannon Hargrove  ??? ? ? ?Influenza Vaccine  on??03/01/21.??Satisfied by Nubai Young CMA  ??? ? ? ?Obesity Screening on??03/30/21.??Satisfied by Nubia Young CMA  ??? ??Refused?  ??? ? ? ?Smoking Cessation on??08/19/20.??Recorded by Deanne Larose LPN??Reason: Patient Refuses  ?

## 2022-05-02 NOTE — HISTORICAL [facility]
This is a historical note converted from [place]. Formatting and pictures may have been removed.  Please reference [place] for original formatting and attached multimedia. Chief Complaint  MED RECHECK  History of Present Illness  Patient with prediabetes and hypothyroidism. ?Her?last appointment with us was 2/18/2020.  Negative COVID test last week. Tested because she is exhausted. Working 60 hours a week.  Nicotine 1/2 ppd.? No exercise.? Working 2 jobs, both involve medical coding.  Has not had Flu vaccine, wants to wait until after COVID vaccine.  Review of Systems  General:??????????????? Fatigue  HEENT:?????????????????? Denies vision changes or eye pain.? No sore throat, ear pain, sinus pressure or discharge.  Cardiovascular:??? Denies chest pain, palpitations, dyspnea on exertion, orthopnea.  Respiratory:???????? No cough, wheezing, shortness of breath, or sputum.  GI:????????????????????????? Denies nausea, emesis, constipation, diarrhea, melena, hematochezia or abdominal pain  :??????????????????????? No frequency, urgency, hematuria, discharge, or incontinence  MS:?????????????????????? Denies myalgias, arthralgias, joint effusion, edema, or weakness  Neuro:????????????????? No headaches, numbness in extremities, tingling, dizziness, or weakness  Psych:?????????????????? Denies anxiety, depression, suicidal or homicidal ideations, or irritability  Endo:??????????????????? Denies polyuria, polydipsia, polyphagia  Heme:????????????????? No abnormal bleeding or bruising. No lymph node enlargement or pain.  Physical Exam  Vitals & Measurements  HR:?76(Peripheral)? BP:?128/78?  HT:?158?cm? HT:?158.00?cm? WT:?124.5?kg? WT:?124.500?kg? BMI:?49.87?  General :?????Well-developed , modbidly obese femalie in no apparent distress, alert and oriented ?4  Neck :?????Supple, no lymphadenopathy, no thyromegaly, no bruits, no jugular venous distention  Cardiovascular :?????? Regular rhythm and rate, no murmurs, radial and  dorsal pedal pulses 2+ bilaterally  Respiratory :??????Lungs clear to auscultation bilaterally, no wheezes, no crackles, no rhonchi.? Good air movement  Abdomen :?????? ?NABS, soft, nontender, no hepatosplenomegaly, no masses, no guarding or rebound????????????????????????  MS :??????? No muscle tenderness, joints WNL, FROM, negative SLR, no?CCE  Neuro :? ?Grossly intact  Heme :?????? No bruising or petechiae?  Back:??????? no CVAT  Assessment/Plan  1.?Hypothyroid?E03.9  ?Will check her levels and call her with the results. Will wait on refilling levothyroxine.  2.?Prediabetes?R73.03  ?We will check an A1c and microalbumin  3.?Fatigue?R53.83  ?We will check a vitamin D level  4.?Nicotine dependence?F17.200  ?Instructed on adverse health consequences of nicotine use.? Educated on?ways to quit, including?pharmaceutical regimens.  5.?Morbid obesity?E66.01  ?Long-term adverse consequence of obesity discussed with patient at length, she is strongly encouraged to lose weight.   Problem List/Past Medical History  Ongoing  Acute medial meniscal injury of right knee  Chondromalacia patellae, right knee  Iliotibial band syndrome, right leg  Left knee pain  Morbid obesity  Prepatellar bursitis, right knee  Sacroiliitis  Sprain of medial collateral ligament of left knee  Tobacco user  Historical  No qualifying data  Procedure/Surgical History  Colonoscopy (12/19/2019)  Colonoscopy (10/26/2012)  [place] teeth extracted (2008)  History of total vaginal hysterectomy (TVH) (2004)  corpal tunnel sx x 2  hysterectomy  wisdom teeth extraction   Medications  Astelin 137 mcg/inh nasal spray, 2 spray(s), Nasal, BID, 5 refills  levothyroxine 75 mcg (0.075 mg) oral tablet, See Instructions  Allergies  No Known Allergies  Social History  Abuse/Neglect  No, 03/01/2021  No, 08/25/2020  Alcohol  Never, 04/17/2018  Employment/School  Unemployed, 08/27/2019  Home/Environment  Lives with Children., 08/27/2019  Tobacco  Former smoker, quit more  than 30 days ago, No, 03/01/2021  Former smoker, quit more than 30 days ago, No, 08/25/2020  Family History  Alzheimers disease: Father.  Asthma.: Mother.  Diabetes mellitus type 2: Mother.  Hypertension.: Mother.  Hyperthyroidism.: Sister.  Thyroid disease: Mother.  Sister: History is negative  Sister: History is negative  Immunizations  Vaccine Date Status   influenza virus vaccine, inactivated 09/25/2019 Given   Health Maintenance  Health Maintenance  ???Pending?(in the next year)  ??? ??OverDue  ??? ? ? ?Cervical Cancer Screening due??04/30/17??and every 3??year(s)  ??? ? ? ?Depression Screening due??04/17/19??and every 1??year(s)  ??? ? ? ?Influenza Vaccine due??10/01/20??and every 1??day(s)  ??? ? ? ?Alcohol Misuse Screening due??01/02/21??and every 1??year(s)  ??? ??Due?  ??? ? ? ?Tetanus Vaccine due??03/07/21??and every 10??year(s)  ??? ??Refused?  ??? ? ? ?Smoking Cessation due??01/01/21??and every 1??year(s)  ??? ??Due In Future?  ??? ? ? ?ADL Screening not due until??08/19/21??and every 1??year(s)  ??? ? ? ?Obesity Screening not due until??01/01/22??and every 1??year(s)  ??? ? ? ?Blood Pressure Screening not due until??03/01/22??and every 1??year(s)  ??? ? ? ?Body Mass Index Check not due until??03/01/22??and every 1??year(s)  ??? ? ? ?Diabetes Screening not due until??03/01/22??and every 1??year(s)  ???Satisfied?(in the past 1 year)  ??? ??Satisfied?  ??? ? ? ?ADL Screening on??08/19/20.??Satisfied by Yousuf Kim  ??? ? ? ?Alcohol Misuse Screening on??08/19/20.??Satisfied by Deanne Larose LPN  ??? ? ? ?Blood Pressure Screening on??03/01/21.??Satisfied by Nubia Young CMA  ??? ? ? ?Body Mass Index Check on??03/01/21.??Satisfied by Nubia Young CMA  ??? ? ? ?Diabetes Screening on??03/01/21.??Satisfied by Shannon Hargrove  ??? ? ? ?Influenza Vaccine on??03/01/21.??Satisfied by Nubia Young CMA  ??? ? ? ?Obesity Screening on??03/01/21.??Satisfied by Nubia Young CMA  S.  ??? ??Refused?  ??? ? ? ?Smoking Cessation on??08/19/20.??Recorded by Deanne Larose LPN??Reason: Patient Refuses  ?

## 2022-05-25 ENCOUNTER — HOSPITAL ENCOUNTER (EMERGENCY)
Facility: HOSPITAL | Age: 47
Discharge: HOME | End: 2022-05-26
Attending: STUDENT IN AN ORGANIZED HEALTH CARE EDUCATION/TRAINING PROGRAM
Payer: COMMERCIAL

## 2022-05-25 DIAGNOSIS — S39.012A BACK STRAIN, INITIAL ENCOUNTER: ICD-10-CM

## 2022-05-25 DIAGNOSIS — M54.10 RADICULAR PAIN: ICD-10-CM
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
## 2022-05-25 DIAGNOSIS — S39.012A STRAIN OF LUMBAR REGION, INITIAL ENCOUNTER: ICD-10-CM

## 2022-05-25 PROCEDURE — 99285 EMERGENCY DEPT VISIT HI MDM: CPT | Mod: 25

## 2022-05-25 RX ORDER — KETOROLAC TROMETHAMINE 30 MG/ML
60 INJECTION, SOLUTION INTRAMUSCULAR; INTRAVENOUS
Status: COMPLETED | OUTPATIENT
Start: 2022-05-26 | End: 2022-05-26

## 2022-05-25 RX ORDER — ORPHENADRINE CITRATE 100 MG/1
100 TABLET, EXTENDED RELEASE ORAL 2 TIMES DAILY
Status: DISCONTINUED | OUTPATIENT
Start: 2022-05-26 | End: 2022-05-26 | Stop reason: HOSPADM

## 2022-05-25 RX ORDER — ACETAMINOPHEN 500 MG
1000 TABLET ORAL
Status: COMPLETED | OUTPATIENT
Start: 2022-05-26 | End: 2022-05-26
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
## 2022-05-26 VITALS
BODY MASS INDEX: 50.31 KG/M2 | RESPIRATION RATE: 18 BRPM | HEART RATE: 67 BPM | OXYGEN SATURATION: 100 % | WEIGHT: 276.88 LBS | DIASTOLIC BLOOD PRESSURE: 97 MMHG | TEMPERATURE: 98 F | SYSTOLIC BLOOD PRESSURE: 160 MMHG

## 2022-05-26 PROCEDURE — 25000003 PHARM REV CODE 250: Performed by: STUDENT IN AN ORGANIZED HEALTH CARE EDUCATION/TRAINING PROGRAM

## 2022-05-26 PROCEDURE — 96372 THER/PROPH/DIAG INJ SC/IM: CPT | Performed by: STUDENT IN AN ORGANIZED HEALTH CARE EDUCATION/TRAINING PROGRAM

## 2022-05-26 PROCEDURE — 63600175 PHARM REV CODE 636 W HCPCS: Performed by: STUDENT IN AN ORGANIZED HEALTH CARE EDUCATION/TRAINING PROGRAM

## 2022-05-26 RX ORDER — HYDROCODONE BITARTRATE AND ACETAMINOPHEN 5; 325 MG/1; MG/1
1 TABLET ORAL EVERY 12 HOURS PRN
Qty: 9 TABLET | Refills: 0 | Status: SHIPPED | OUTPATIENT
Start: 2022-05-26 | End: 2022-05-31

## 2022-05-26 RX ORDER — IBUPROFEN 600 MG/1
600 TABLET ORAL EVERY 8 HOURS PRN
Qty: 15 TABLET | Refills: 0 | Status: SHIPPED | OUTPATIENT
Start: 2022-05-26 | End: 2022-05-31

## 2022-05-26 RX ORDER — DICLOFENAC SODIUM 10 MG/G
2 GEL TOPICAL 4 TIMES DAILY
Qty: 100 G | Refills: 0 | Status: SHIPPED | OUTPATIENT
Start: 2022-05-26 | End: 2022-11-18

## 2022-05-26 RX ORDER — METHOCARBAMOL 750 MG/1
1500 TABLET, FILM COATED ORAL 3 TIMES DAILY
Qty: 30 TABLET | Refills: 0 | Status: SHIPPED | OUTPATIENT
Start: 2022-05-26 | End: 2022-05-31

## 2022-05-26 RX ORDER — LIDOCAINE 560 MG/1
1 PATCH PERCUTANEOUS; TOPICAL; TRANSDERMAL DAILY
Qty: 10 PATCH | Refills: 0 | Status: SHIPPED | OUTPATIENT
Start: 2022-05-26 | End: 2022-06-05

## 2022-05-26 RX ADMIN — ACETAMINOPHEN 1000 MG: 500 TABLET ORAL at 12:05

## 2022-05-26 RX ADMIN — ORPHENADRINE CITRATE 100 MG: 100 TABLET, EXTENDED RELEASE ORAL at 01:05

## 2022-05-26 RX ADMIN — KETOROLAC TROMETHAMINE 60 MG: 60 INJECTION, SOLUTION INTRAMUSCULAR at 12:05

## 2022-05-26 NOTE — DISCHARGE INSTRUCTIONS
Follow-up with primary care physician.    Take medications as prescribed.    Return to the emergency department for any worsening pain, difficulty breathing, nausea, vomiting, loss of bladder or bowel function, weakness, numbness fever, or any symptoms.
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
## 2022-09-13 PROBLEM — E55.9 VITAMIN D DEFICIENCY: Status: ACTIVE | Noted: 2022-09-13

## 2022-09-13 PROBLEM — E66.01 CLASS 3 SEVERE OBESITY IN ADULT: Status: ACTIVE | Noted: 2022-09-13

## 2022-09-13 PROBLEM — E66.813 CLASS 3 SEVERE OBESITY IN ADULT: Status: ACTIVE | Noted: 2022-09-13

## 2022-09-13 PROBLEM — E66.01 OBESITIES, MORBID: Status: ACTIVE | Noted: 2022-09-13

## 2022-09-13 PROBLEM — E03.9 HYPOTHYROIDISM: Status: ACTIVE | Noted: 2022-09-13

## 2022-09-13 PROBLEM — R73.03 PREDIABETES: Status: ACTIVE | Noted: 2022-09-13

## 2022-09-13 PROBLEM — E66.01 OBESITIES, MORBID: Status: RESOLVED | Noted: 2022-09-13 | Resolved: 2022-09-13

## 2022-09-13 PROBLEM — M85.80 OSTEOPENIA: Status: ACTIVE | Noted: 2022-09-13

## 2022-09-16 ENCOUNTER — HISTORICAL (OUTPATIENT)
Dept: ADMINISTRATIVE | Facility: HOSPITAL | Age: 47
End: 2022-09-16
Payer: MEDICAID

## 2022-11-18 PROBLEM — Z72.0 TOBACCO USER: Status: ACTIVE | Noted: 2022-11-18

## 2022-11-18 PROBLEM — F41.1 ANXIETY STATE: Status: ACTIVE | Noted: 2022-11-18

## 2022-11-18 PROBLEM — F32.9 MAJOR DEPRESSIVE DISORDER WITH SINGLE EPISODE: Status: ACTIVE | Noted: 2022-11-18

## 2022-11-18 PROCEDURE — 87070 CULTURE OTHR SPECIMN AEROBIC: CPT | Performed by: NURSE PRACTITIONER

## 2023-01-11 PROBLEM — D48.5 NEOPLASM OF UNCERTAIN BEHAVIOR OF SKIN: Status: ACTIVE | Noted: 2023-01-11

## 2023-01-11 PROBLEM — R22.2 MASS OF SUBCUTANEOUS TISSUE OF BACK: Status: ACTIVE | Noted: 2023-01-11

## 2023-04-04 ENCOUNTER — PATIENT MESSAGE (OUTPATIENT)
Dept: RESEARCH | Facility: HOSPITAL | Age: 48
End: 2023-04-04
Payer: COMMERCIAL

## 2023-04-06 PROBLEM — G47.33 OSA ON CPAP: Status: ACTIVE | Noted: 2023-04-06

## 2023-06-14 ENCOUNTER — HOSPITAL ENCOUNTER (OUTPATIENT)
Dept: RADIOLOGY | Facility: HOSPITAL | Age: 48
Discharge: HOME | End: 2023-06-14
Payer: COMMERCIAL

## 2023-06-14 DIAGNOSIS — Z12.31 BREAST CANCER SCREENING BY MAMMOGRAM: ICD-10-CM

## 2023-06-15 ENCOUNTER — HOSPITAL ENCOUNTER (OUTPATIENT)
Dept: RADIOLOGY | Facility: HOSPITAL | Age: 48
Discharge: HOME | End: 2023-06-15
Payer: COMMERCIAL

## 2023-06-15 DIAGNOSIS — R92.8 ABNORMAL MAMMOGRAM: ICD-10-CM

## 2023-06-15 PROCEDURE — 77066 MAMMO DIGITAL DIAGNOSTIC BILAT WITH TOMO: ICD-10-PCS | Mod: 26,,, | Performed by: RADIOLOGY

## 2023-06-15 PROCEDURE — 77062 BREAST TOMOSYNTHESIS BI: CPT | Mod: 26,,, | Performed by: RADIOLOGY

## 2023-06-15 PROCEDURE — 77066 DX MAMMO INCL CAD BI: CPT | Mod: TC

## 2023-06-15 PROCEDURE — 76641 ULTRASOUND BREAST COMPLETE: CPT | Mod: TC,RT

## 2023-06-15 PROCEDURE — 77066 DX MAMMO INCL CAD BI: CPT | Mod: 26,,, | Performed by: RADIOLOGY

## 2023-06-15 PROCEDURE — 76641 US BREAST RIGHT COMPLETE: ICD-10-PCS | Mod: 26,RT,, | Performed by: RADIOLOGY

## 2023-06-15 PROCEDURE — 77062 MAMMO DIGITAL DIAGNOSTIC BILAT WITH TOMO: ICD-10-PCS | Mod: 26,,, | Performed by: RADIOLOGY

## 2023-06-15 PROCEDURE — 76641 ULTRASOUND BREAST COMPLETE: CPT | Mod: 26,RT,, | Performed by: RADIOLOGY

## 2023-06-20 ENCOUNTER — ANESTHESIA EVENT (OUTPATIENT)
Dept: ENDOSCOPY | Facility: HOSPITAL | Age: 48
End: 2023-06-20
Payer: COMMERCIAL

## 2023-06-20 RX ORDER — LIDOCAINE HYDROCHLORIDE 10 MG/ML
1 INJECTION, SOLUTION EPIDURAL; INFILTRATION; INTRACAUDAL; PERINEURAL ONCE
Status: CANCELLED | OUTPATIENT
Start: 2023-06-20 | End: 2023-06-20

## 2023-06-20 RX ORDER — GLUCOSAMINE/CHONDR SU A SOD 167-133 MG
100 CAPSULE ORAL NIGHTLY
COMMUNITY

## 2023-06-20 RX ORDER — CLINDAMYCIN PHOSPHATE 10 UG/ML
LOTION TOPICAL 2 TIMES DAILY
COMMUNITY

## 2023-06-20 RX ORDER — SODIUM CHLORIDE, SODIUM LACTATE, POTASSIUM CHLORIDE, CALCIUM CHLORIDE 600; 310; 30; 20 MG/100ML; MG/100ML; MG/100ML; MG/100ML
INJECTION, SOLUTION INTRAVENOUS CONTINUOUS
Status: CANCELLED | OUTPATIENT
Start: 2023-06-20

## 2023-06-20 RX ORDER — ONDANSETRON 2 MG/ML
4 INJECTION INTRAMUSCULAR; INTRAVENOUS ONCE AS NEEDED
Status: CANCELLED | OUTPATIENT
Start: 2023-06-20 | End: 2034-11-16

## 2023-06-20 NOTE — ANESTHESIA PREPROCEDURE EVALUATION
06/20/2023  Chante Heller is a 47 y.o., female , who presents for the following:    Procedure: COLON (Abdomen)   Anesthesia type: General/MAC   Diagnosis:        Screen for colon cancer [Z12.11]       Family history of colonic polyps [Z83.71]       Personal history of colonic polyps [Z86.010]   Location: [facility] ENDO 02 / [facility] ENDOSCOPY   Surgeons: Anirudh Ulloa MD     LAB:    03/31 0829    HGB 12.5       WBC 6.1       Platelets 314              K+ 4.9       Creatinine 0.85       Glucose 109 High             Pre-op Assessment    I have reviewed the Patient Summary Reports.     I have reviewed the Nursing Notes. I have reviewed the NPO Status.   I have reviewed the Medications.     Review of Systems  Anesthesia Hx:  No problems with previous Anesthesia  Denies Family Hx of Anesthesia complications.   Denies Personal Hx of Anesthesia complications.   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), Hoarseness occurs w/ stress & excessive talking   Cardiovascular:   Hypertension    Pulmonary:   Sleep Apnea, CPAP    Endocrine:   Hypothyroidism Pre-diabetes Morbid Obesity / BMI > 40  Psych:   anxiety depression          Physical Exam  General: Alert and Oriented  Morbid Obesity  Airway:  Mallampati: IV   Mouth Opening: Small, but > 3cm  TM Distance: Normal  Tongue: Normal, Large  Neck ROM: Normal ROM  Short Neck  Dental:  Intact    Chest/Lungs:  Normal Respiratory Rate    Heart:  Rate: Normal  Rhythm: Regular Rhythm        Anesthesia Plan  Type of Anesthesia, risks & benefits discussed:    Anesthesia Type: Gen Natural Airway  Intra-op Monitoring Plan: Standard ASA Monitors  Post Op Pain Control Plan: IV/PO Opioids PRN  Induction:  IV  Airway Plan: Direct  Informed Consent: Informed consent signed with the Patient and all parties understand the risks and  agree with anesthesia plan.  All questions answered. Patient consented to blood products? No  ASA Score: 3  Day of Surgery Review of History & Physical: H&P Update referred to the surgeon/provider.  Anesthesia Plan Notes: Nasal cannula vs facemask supplemental oxygenation   For patients with JUAN RAMON/obesity, may consider SuperNoval Nasal CPAP      Ready For Surgery From Anesthesia Perspective.     .

## 2023-06-21 ENCOUNTER — HOSPITAL ENCOUNTER (OUTPATIENT)
Facility: HOSPITAL | Age: 48
Discharge: HOME | End: 2023-06-21
Attending: INTERNAL MEDICINE | Admitting: INTERNAL MEDICINE
Payer: COMMERCIAL

## 2023-06-21 ENCOUNTER — ANESTHESIA (OUTPATIENT)
Dept: ENDOSCOPY | Facility: HOSPITAL | Age: 48
End: 2023-06-21
Payer: COMMERCIAL

## 2023-06-21 VITALS
WEIGHT: 269 LBS | HEART RATE: 75 BPM | TEMPERATURE: 98 F | BODY MASS INDEX: 47.66 KG/M2 | HEIGHT: 63 IN | DIASTOLIC BLOOD PRESSURE: 66 MMHG | OXYGEN SATURATION: 99 % | RESPIRATION RATE: 15 BRPM | SYSTOLIC BLOOD PRESSURE: 114 MMHG

## 2023-06-21 DIAGNOSIS — Z12.11 SCREEN FOR COLON CANCER: ICD-10-CM

## 2023-06-21 DIAGNOSIS — Z86.010 PERSONAL HISTORY OF COLONIC POLYPS: ICD-10-CM

## 2023-06-21 DIAGNOSIS — Z83.719 FAMILY HISTORY OF COLONIC POLYPS: ICD-10-CM

## 2023-06-21 PROCEDURE — D9220A PRA ANESTHESIA: Mod: 33,ANES,, | Performed by: ANESTHESIOLOGY

## 2023-06-21 PROCEDURE — 45380 COLONOSCOPY AND BIOPSY: CPT | Mod: PT | Performed by: INTERNAL MEDICINE

## 2023-06-21 PROCEDURE — 63600175 PHARM REV CODE 636 W HCPCS: Performed by: NURSE ANESTHETIST, CERTIFIED REGISTERED

## 2023-06-21 PROCEDURE — D9220A PRA ANESTHESIA: ICD-10-PCS | Mod: 33,ANES,, | Performed by: ANESTHESIOLOGY

## 2023-06-21 PROCEDURE — 37000008 HC ANESTHESIA 1ST 15 MINUTES: Performed by: INTERNAL MEDICINE

## 2023-06-21 PROCEDURE — D9220A PRA ANESTHESIA: Mod: 33,CRNA,, | Performed by: NURSE ANESTHETIST, CERTIFIED REGISTERED

## 2023-06-21 PROCEDURE — 27201423 OPTIME MED/SURG SUP & DEVICES STERILE SUPPLY: Performed by: INTERNAL MEDICINE

## 2023-06-21 PROCEDURE — 37000009 HC ANESTHESIA EA ADD 15 MINS: Performed by: INTERNAL MEDICINE

## 2023-06-21 PROCEDURE — D9220A PRA ANESTHESIA: ICD-10-PCS | Mod: 33,CRNA,, | Performed by: NURSE ANESTHETIST, CERTIFIED REGISTERED

## 2023-06-21 PROCEDURE — 25000003 PHARM REV CODE 250: Performed by: NURSE ANESTHETIST, CERTIFIED REGISTERED

## 2023-06-21 RX ORDER — GLYCOPYRROLATE 0.2 MG/ML
INJECTION INTRAMUSCULAR; INTRAVENOUS
Status: DISCONTINUED | OUTPATIENT
Start: 2023-06-21 | End: 2023-06-21

## 2023-06-21 RX ORDER — PROPOFOL 10 MG/ML
VIAL (ML) INTRAVENOUS
Status: COMPLETED
Start: 2023-06-21 | End: 2023-06-21

## 2023-06-21 RX ORDER — LIDOCAINE HYDROCHLORIDE 20 MG/ML
INJECTION, SOLUTION EPIDURAL; INFILTRATION; INTRACAUDAL; PERINEURAL
Status: DISCONTINUED | OUTPATIENT
Start: 2023-06-21 | End: 2023-06-21

## 2023-06-21 RX ORDER — PHENYLEPHRINE HYDROCHLORIDE 10 MG/ML
INJECTION INTRAVENOUS
Status: DISCONTINUED
Start: 2023-06-21 | End: 2023-06-21 | Stop reason: HOSPADM

## 2023-06-21 RX ORDER — LIDOCAINE HYDROCHLORIDE 10 MG/ML
INJECTION, SOLUTION EPIDURAL; INFILTRATION; INTRACAUDAL; PERINEURAL
Status: DISCONTINUED
Start: 2023-06-21 | End: 2023-06-21 | Stop reason: HOSPADM

## 2023-06-21 RX ORDER — PROPOFOL 10 MG/ML
VIAL (ML) INTRAVENOUS
Status: DISCONTINUED | OUTPATIENT
Start: 2023-06-21 | End: 2023-06-21

## 2023-06-21 RX ORDER — GLYCOPYRROLATE 0.2 MG/ML
INJECTION INTRAMUSCULAR; INTRAVENOUS
Status: COMPLETED
Start: 2023-06-21 | End: 2023-06-21

## 2023-06-21 RX ADMIN — PROPOFOL 50 MG: 10 INJECTION, EMULSION INTRAVENOUS at 08:06

## 2023-06-21 RX ADMIN — GLYCOPYRROLATE 0.2 MG: 0.2 INJECTION INTRAMUSCULAR; INTRAVENOUS at 08:06

## 2023-06-21 RX ADMIN — LIDOCAINE HYDROCHLORIDE 50 MG: 20 INJECTION, SOLUTION INTRAVENOUS at 08:06

## 2023-06-21 RX ADMIN — SODIUM CHLORIDE, SODIUM GLUCONATE, SODIUM ACETATE, POTASSIUM CHLORIDE AND MAGNESIUM CHLORIDE: 526; 502; 368; 37; 30 INJECTION, SOLUTION INTRAVENOUS at 08:06

## 2023-06-21 NOTE — TRANSFER OF CARE
"Anesthesia Transfer of Care Note    Patient: Chante Heller    Procedure(s) Performed: Procedure(s) (LRB):  COLON (N/A)  COLONOSCOPY, WITH 1 OR MORE BIOPSIES    Patient location: GI    Anesthesia Type: general    Transport from OR: Transported from OR on room air with adequate spontaneous ventilation    Post pain: adequate analgesia    Post assessment: no apparent anesthetic complications and tolerated procedure well    Post vital signs: stable    Level of consciousness: sedated    Nausea/Vomiting: no nausea/vomiting    Complications: none    Transfer of care protocol was followed      Last vitals:   Visit Vitals  /78   Pulse 77   Temp 36.5 °C (97.7 °F)   Resp 15   Ht 5' 2.5" (1.588 m)   Wt 122 kg (269 lb)   SpO2 97%   Breastfeeding No   BMI 48.42 kg/m²     "

## 2023-06-21 NOTE — ANESTHESIA POSTPROCEDURE EVALUATION
Anesthesia Post Evaluation    Patient: Chante Heller    Procedure(s) Performed: Procedure(s) (LRB):  COLON (N/A)  COLONOSCOPY, WITH 1 OR MORE BIOPSIES    Final Anesthesia Type: general      Patient location during evaluation: GI PACU  Patient participation: Yes- Able to Participate  Level of consciousness: oriented and awake  Post-procedure vital signs: reviewed and stable  Pain management: adequate  Airway patency: patent    PONV status at discharge: No PONV  Anesthetic complications: no      Cardiovascular status: stable and hemodynamically stable  Respiratory status: spontaneous ventilation and unassisted  Hydration status: euvolemic  Follow-up not needed.  Comments: [facility]          Vitals Value Taken Time   /61 06/21/23 0858   Temp 36.5 °C (97.7 °F) 06/21/23 0858   Pulse 87 06/21/23 0858   Resp 20 06/21/23 0858   SpO2 94 % 06/21/23 0858         No case tracking events are documented in the log.      Pain/Gina Score: Gina Score: 9 (6/21/2023  8:58 AM)

## 2023-06-21 NOTE — [employer] PATIENT INSTRUCTIONS
Discharge Summary/Instructions after an Endoscopic Procedure  Patient Name: Chante Heller  Patient MRN: [id number]  Patient YOB: 1975  Wednesday, June 21, 2023  Anirudh Ulloa MD  Dear patient,  As a result of recent federal legislation (The Federal Cures Act), you may   receive lab or pathology results from your procedure in your [facility]   account before your physician is able to contact you. Your physician or   their representative will relay the results to you with their   recommendations at their soonest availability.  Thank you,  RESTRICTIONS:  During your procedure today, you received medications for sedation.  These   medications may affect your judgment, balance and coordination.  Therefore,   for 24 hours, you have the following restrictions:   - DO NOT drive a car, operate machinery, make legal/financial decisions,   sign important papers or drink alcohol.    ACTIVITY:  Today: no heavy lifting, straining or running due to procedural   sedation/anesthesia.  The following day: return to full activity including work.  DIET:  Eat and drink normally unless instructed otherwise.     TREATMENT FOR COMMON SIDE EFFECTS:  - Mild abdominal pain, nausea, belching, bloating or excessive gas:  rest,   eat lightly and use a heating pad.  - Sore Throat: treat with throat lozenges and/or gargle with warm salt   water.  - Because air was used during the procedure, expelling large amounts of air   from your rectum or belching is normal.  - If a bowel prep was taken, you may not have a bowel movement for 1-3 days.    This is normal.  SYMPTOMS TO WATCH FOR AND REPORT TO YOUR PHYSICIAN:  1. Abdominal pain or bloating, other than gas cramps.  2. Chest pain.  3. Back pain.  4. Signs of infection such as: chills or fever occurring within 24 hours   after the procedure.  5. Rectal bleeding, which would show as bright red, maroon, or black stools.   (A tablespoon of blood from the rectum is not serious, especially  if   hemorrhoids are present.)  6. Vomiting.  7. Weakness or dizziness.  GO DIRECTLY TO THE NEAREST EMERGENCY ROOM IF YOU HAVE ANY OF THE FOLLOWING:      Difficulty breathing              Chills and/or fever over 101 F   Persistent vomiting and/or vomiting blood   Severe abdominal pain   Severe chest pain   Black, tarry stools   Bleeding- more than one tablespoon   Any other symptom or condition that you feel may need urgent attention  Your doctor recommends these additional instructions:  If any biopsies were taken, your doctors clinic will contact you in 1 to 2   weeks with any results.  - Patient has a contact number available for emergencies.  The signs and   symptoms of potential delayed complications were discussed with the   patient.  Return to normal activities tomorrow.  Written discharge   instructions were provided to the patient.   - Discharge patient to home (with escort).   - Advance diet as tolerated.   - Await pathology results.   - Pending pathology, consider CT enterography next, concern for Crohn's,   note celebrex use.  - Repeat colonoscopy in 5 years for adenoma surveillance.   - The findings and recommendations were discussed with the patient.  For questions, problems or results please call your physician - Anirudh Ulloa MD at Work:  [phone].  [facility]ONE NUM[facility]: [phone]  IF A COMPLICATION OR EMERGENCY SITUATION ARISES AND YOU ARE UNABLE TO REACH   YOUR PHYSICIAN - GO DIRECTLY TO THE EMERGENCY ROOM.  MD Anirudh Pang MD  6/21/2023 9:13:03 AM  This report has been verified and signed electronically.  Dear patient,  As a result of recent federal legislation (The Federal Cures Act), you may   receive lab or pathology results from your procedure in your [facility]   account before your physician is able to contact you. Your physician or   their representative will relay the results to you with their   recommendations at their  soonest availability.  Thank you,  PROVATION

## 2023-06-21 NOTE — H&P
[facility]  Gastroenterology  H&P    Patient Name: Chante Heller  MRN: [id number]  Admission Date: 6/21/2023  Code Status: No Order    Attending Provider: Anirudh Ulloa MD   Primary Care Physician: DISHA JEONG MD (Inactive)  Principal Problem:<principal problem not specified>    Subjective:     History of Present Illness: 47 year old  female here for outpatient colonoscopy, previous polyps noted, patient of Dr. Shelley.  Past Medical History:   Diagnosis Date    Sebaceous cyst     Thyroid disease        Past Surgical History:   Procedure Laterality Date    Colonoscopy (10/26/2012)      EXCISION OF MASS OF CHEST N/A 01/11/2023    Procedure: EXCISION, MASS, CHEST;  Surgeon: Frank Montemayor MD;  Location: [facility] OR;  Service: General;  Laterality: N/A;    EXCISION, MASS N/A 01/11/2023    Procedure: EXCISION OFSOFT TISSUE MASS LEFT UPPER BACK 2CM, SKIN NEOPLASM CHEST 1CM;  Surgeon: Frank Montemayor MD;  Location: [facility] OR;  Service: General;  Laterality: N/A;    History of total vaginal hysterectomy (TVH) (2004)      PARATHYROIDECTOMY      [place] teeth extracted (2008)         Review of patient's allergies indicates:   Allergen Reactions    Glycerin (adult)      Family History       Problem Relation (Age of Onset)    Alzheimer's disease Father    Arthritis Mother    Asthma Mother    Diabetes Mother    Hyperlipidemia Mother    Hypertension Mother    Hyperthyroidism Mother, Sister    Hypothyroidism Sister    No Known Problems Sister          Tobacco Use    Smoking status: Every Day     Packs/day: 0.50     Types: Cigarettes    Smokeless tobacco: Never   Substance and Sexual Activity    Alcohol use: Not Currently    Drug use: Never    Sexual activity: Not on file     Review of Systems  Objective:     Vital Signs (Most Recent):  Temp: 97.7 °F (36.5 °C) (06/21/23 0731)  Pulse: 77 (06/21/23 0731)  Resp: 15 (06/21/23 0731)  BP: 136/78 (06/21/23 0731)  SpO2: 97 %  (06/21/23 0731) Vital Signs (24h Range):  Temp:  [97.7 °F (36.5 °C)] 97.7 °F (36.5 °C)  Pulse:  [77] 77  Resp:  [15] 15  SpO2:  [97 %] 97 %  BP: (136)/(78) 136/78     Weight: 122 kg (269 lb) (06/21/23 0731)  Body mass index is 48.42 kg/m².    No intake or output data in the 24 hours ending 06/21/23 0832    Lines/Drains/Airways       Peripheral Intravenous Line  Duration                  Peripheral IV - Single Lumen 06/21/23 0748 22 G Posterior;Right Hand <1 day                    Physical Exam    Significant Labs:  None    Significant Imaging:  Imaging results within the past 24 hours have been reviewed.    Assessment/Plan:     There are no hospital problems to display for this patient.    Procedure risks and benefits were discussed in detail today and an opportunity to answer patient/family questions was provided.       Anirudh Ulloa MD  Gastroenterology  [facility]

## 2023-06-22 LAB — PSYCHE PATHOLOGY RESULT: NORMAL

## 2023-10-06 PROCEDURE — 87389 HIV-1 AG W/HIV-1&-2 AB AG IA: CPT | Performed by: NURSE PRACTITIONER

## 2023-10-06 PROCEDURE — 86803 HEPATITIS C AB TEST: CPT | Performed by: NURSE PRACTITIONER

## 2023-10-09 PROBLEM — Z86.010 PERSONAL HISTORY OF COLONIC POLYPS: Status: ACTIVE | Noted: 2023-10-09

## 2023-10-09 PROBLEM — Z83.719 FAMILY HISTORY OF COLONIC POLYPS: Status: ACTIVE | Noted: 2019-12-19

## 2023-10-09 PROBLEM — Z86.0100 PERSONAL HISTORY OF COLONIC POLYPS: Status: ACTIVE | Noted: 2023-10-09

## 2024-06-21 ENCOUNTER — HOSPITAL ENCOUNTER (OUTPATIENT)
Dept: RADIOLOGY | Facility: HOSPITAL | Age: 49
Discharge: HOME | End: 2024-06-21
Payer: COMMERCIAL

## 2024-06-21 DIAGNOSIS — Z12.31 ENCOUNTER FOR SCREENING MAMMOGRAM FOR BREAST CANCER: ICD-10-CM

## 2024-06-21 PROCEDURE — 77067 SCR MAMMO BI INCL CAD: CPT | Mod: TC

## 2024-06-21 PROCEDURE — 77067 SCR MAMMO BI INCL CAD: CPT | Mod: 26,,, | Performed by: RADIOLOGY

## 2024-06-21 PROCEDURE — 77063 BREAST TOMOSYNTHESIS BI: CPT | Mod: 26,,, | Performed by: RADIOLOGY

## 2024-06-28 ENCOUNTER — OFFICE VISIT (OUTPATIENT)
Dept: FAMILY MEDICINE | Facility: CLINIC | Age: 49
End: 2024-06-28
Payer: COMMERCIAL

## 2024-06-28 VITALS
OXYGEN SATURATION: 98 % | SYSTOLIC BLOOD PRESSURE: 133 MMHG | HEIGHT: 62 IN | RESPIRATION RATE: 18 BRPM | HEART RATE: 69 BPM | TEMPERATURE: 98 F | DIASTOLIC BLOOD PRESSURE: 84 MMHG | WEIGHT: 264.63 LBS | BODY MASS INDEX: 48.7 KG/M2

## 2024-06-28 DIAGNOSIS — E66.01 CLASS 3 SEVERE OBESITY DUE TO EXCESS CALORIES WITHOUT SERIOUS COMORBIDITY WITH BODY MASS INDEX (BMI) OF 40.0 TO 44.9 IN ADULT: ICD-10-CM

## 2024-06-28 DIAGNOSIS — I10 PRIMARY HYPERTENSION: ICD-10-CM

## 2024-06-28 DIAGNOSIS — J38.00 VOCAL CORD PARALYSIS: ICD-10-CM

## 2024-06-28 DIAGNOSIS — G47.33 OSA ON CPAP: ICD-10-CM

## 2024-06-28 DIAGNOSIS — Z00.00 ENCOUNTER FOR MEDICAL EXAMINATION TO ESTABLISH CARE: Primary | ICD-10-CM

## 2024-06-28 DIAGNOSIS — E55.9 VITAMIN D DEFICIENCY: ICD-10-CM

## 2024-06-28 DIAGNOSIS — E03.9 HYPOTHYROIDISM, UNSPECIFIED TYPE: ICD-10-CM

## 2024-06-28 DIAGNOSIS — F32.9 MAJOR DEPRESSIVE DISORDER WITH SINGLE EPISODE, REMISSION STATUS UNSPECIFIED: ICD-10-CM

## 2024-06-28 DIAGNOSIS — K08.89 PAIN, DENTAL: ICD-10-CM

## 2024-06-28 DIAGNOSIS — R73.03 PREDIABETES: ICD-10-CM

## 2024-06-28 DIAGNOSIS — Z71.89 GRIEF COUNSELING: ICD-10-CM

## 2024-06-28 DIAGNOSIS — L73.2 HIDRADENITIS: ICD-10-CM

## 2024-06-28 DIAGNOSIS — Z72.0 TOBACCO USER: ICD-10-CM

## 2024-06-28 DIAGNOSIS — F41.1 ANXIETY STATE: ICD-10-CM

## 2024-06-28 PROCEDURE — 99204 OFFICE O/P NEW MOD 45 MIN: CPT | Mod: ,,, | Performed by: FAMILY MEDICINE

## 2024-06-28 RX ORDER — AMOXICILLIN AND CLAVULANATE POTASSIUM 875; 125 MG/1; MG/1
1 TABLET, FILM COATED ORAL EVERY 12 HOURS
Qty: 14 TABLET | Refills: 0 | Status: SHIPPED | OUTPATIENT
Start: 2024-06-28 | End: 2024-07-05

## 2024-06-28 NOTE — PROGRESS NOTES
Chante Heller  07/01/2024  [id number]    Subjective:      Patient ID: Chante Heller is a 48 y.o. female.    Chief Complaint: [facility] ([facility]. )    Disclaimer:  This note is prepared using voice recognition software and as such is likely to have errors despite attempts at proofreading. Please contact me for questions.     48-year-old female who presents to [facility]. She admits to family hx of CAD and recent imaging revealed mild aortic atherosclerosis.    HTN  -  The patient states that she was started on Valsartan 40 mg and has been taking taking the medication for 2 weeks.  -  Patient admits to intentional weight loss of 31 lbs.  Admits to social stressors and admits to long COVID 19 symptoms that may be contributing to elevated blood pressure.  -  Patient admits to occasional headaches.    Headaches  - Mildly improved since starting Valsartan.  - Headaches are located in the temporal and occipital region.  - She states symptoms may also be related to current left molar dental pain. She is scheduled for dental visit 07/12/2024.    Hx of paralyzed left vocal cord  - Patient admits to occasional spasms and/or choking.  - She states vocal cord paralysis 2/2 to previous parathyroidectomy.    Chronic back pain  - Patient admits to bulging disc of L4-L5 that is treated with Celebrex.    Tobacco use  - Currently smoking 1/2 ppd. She states she is ready to quit but would like to self wean cigarette use.  - She previously had difficulty quitting due to grief after recent death of her mother.      History:  Past Medical History:   Diagnosis Date    History of hyperparathyroidism     Hypertension     Sebaceous cyst     Thyroid disease      Past Surgical History:   Procedure Laterality Date    COLONOSCOPY N/A 6/21/2023    Procedure: COLON;  Surgeon: Anirudh Ulloa MD;  Location: [facility] ENDOSCOPY;  Service: Gastroenterology;  Laterality: N/A;    Colonoscopy (10/26/2012)      COLONOSCOPY, WITH 1  OR MORE BIOPSIES  6/21/2023    Procedure: COLONOSCOPY, WITH 1 OR MORE BIOPSIES;  Surgeon: Anirudh Ulloa MD;  Location: [facility] ENDOSCOPY;  Service: Gastroenterology;;    EXCISION OF MASS OF CHEST N/A 01/11/2023    Procedure: EXCISION, MASS, CHEST;  Surgeon: Frank Montemayor MD;  Location: [facility] OR;  Service: General;  Laterality: N/A;    EXCISION, MASS N/A 01/11/2023    Procedure: EXCISION OFSOFT TISSUE MASS LEFT UPPER BACK 2CM, SKIN NEOPLASM CHEST 1CM;  Surgeon: Frank Montemayor MD;  Location: [facility] OR;  Service: General;  Laterality: N/A;    History of total vaginal hysterectomy (TVH) (2004)      PARATHYROIDECTOMY      [place] teeth extracted (2008)       Family History   Problem Relation Name Age of Onset    Hyperlipidemia Mother      Hypertension Mother      Diabetes Mother      Asthma Mother      Hyperthyroidism Mother      Arthritis Mother      Alzheimer's disease Father      Hyperthyroidism Sister      Hypothyroidism Sister      No Known Problems Sister       Social History     Socioeconomic History    Marital status: Single    Number of children: 1   Occupational History    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[place], works from home   Tobacco Use    Smoking status: Every Day     Current packs/day: 0.50     Types: Cigarettes    Smokeless tobacco: Never   Substance and Sexual Activity    Alcohol use: Not Currently    Drug use: Never     Patient Active Problem List   Diagnosis    Hypothyroidism    Prediabetes    Vitamin D deficiency    Osteopenia    Class 3 severe obesity in adult    Tobacco user    Major depressive disorder with single episode    Anxiety state    Mass of subcutaneous tissue of back    JUAN RAMON on CPAP    Family history of colonic polyps    Personal history of colonic polyps    Hypertension     Review of patient's allergies indicates:   Allergen Reactions    Glycerin (adult)      The following were reviewed at this visit: active problem list, medication list, allergies,  family history, social history, and health maintenance.    Medications:  Current Outpatient Medications on File Prior to Visit   Medication Sig Dispense Refill    celecoxib (CELEBREX) 200 MG capsule Take 1 capsule (200 mg total) by mouth 2 (two) times daily. 60 capsule 0    cholecalciferol, vitamin D3, 1,250 mcg (50,000 unit) capsule Take 1 capsule (50,000 Units total) by mouth once a week. 13 capsule 1    clindamycin (CLEOCIN T) 1 % lotion Apply topically 2 (two) times daily. For thigh      cyclobenzaprine (FLEXERIL) 10 MG tablet cyclobenzaprine Take 1 time per day No date recorded tablet 1 time per day No route recorded No set duration recorded No set duration amount recorded suspended 10 mg      L.acid/L.casei/B.bif/B.bettie/FOS (PROBIOTIC BLEND ORAL) Probiotic Take No date recorded No form recorded No frequency recorded No route recorded No set duration recorded No set duration amount recorded active No dosage strength recorded No dosage strength units of measure recorded      linaCLOtide (LINZESS) 72 mcg Cap capsule Take 72 mcg by mouth before breakfast.      mupirocin 2 % OKit Apply topically.      valsartan (DIOVAN) 40 MG tablet Take 1 tablet (40 mg total) by mouth once daily. 30 tablet 5    [DISCONTINUED] levothyroxine (SYNTHROID) 75 MCG tablet Take 1 tablet (75 mcg total) by mouth once daily. 30 tablet 0    niacin 500 MG Tab Take 100 mg by mouth every evening. (Patient not taking: Reported on 6/28/2024)       No current facility-administered medications on file prior to visit.     Review of Systems   Constitutional:  Negative for chills, diaphoresis and fever.   HENT:          Dental pain   Eyes:  Negative for blurred vision and double vision.   Respiratory:  Negative for cough and shortness of breath.    Cardiovascular:  Negative for chest pain.   Gastrointestinal:  Negative for abdominal pain, diarrhea, nausea and vomiting.   Musculoskeletal:  Positive for back pain.   Skin:  Negative for rash.  "  Neurological:  Negative for dizziness and headaches.       Objective:     Vitals:    06/28/24 0750   BP: 133/84   BP Location: Right arm   Patient Position: Sitting   Pulse: 69   Resp: 18   Temp: 97.9 °F (36.6 °C)   TempSrc: Oral   SpO2: 98%   Weight: 120 kg (264 lb 9.6 oz)   Height: 5' 2" (1.575 m)     Physical Exam  Vitals reviewed.   Constitutional:       General: She is not in acute distress.     Appearance: Normal appearance. She is not ill-appearing, toxic-appearing or diaphoretic.   HENT:      Head: Normocephalic.      Mouth/Throat:      Comments: Mild swelling and tenderness in left lower posterior molar region. No visible discharge.  Eyes:      General:         Right eye: No discharge.         Left eye: No discharge.      Extraocular Movements: Extraocular movements intact.      Conjunctiva/sclera: Conjunctivae normal.   Cardiovascular:      Rate and Rhythm: Normal rate and regular rhythm.      Pulses: Normal pulses.      Heart sounds: Normal heart sounds. No murmur heard.     No friction rub. No gallop.   Pulmonary:      Effort: Pulmonary effort is normal. No respiratory distress.      Breath sounds: Normal breath sounds. No stridor. No wheezing, rhonchi or rales.   Musculoskeletal:         General: Normal range of motion.      Cervical back: Normal range of motion and neck supple. No rigidity.   Skin:     General: Skin is warm and dry.      Coloration: Skin is not pale.   Neurological:      General: No focal deficit present.      Mental Status: She is alert and oriented to person, place, and time. Mental status is at baseline.   Psychiatric:         Mood and Affect: Mood normal.         Behavior: Behavior normal.         Thought Content: Thought content normal.         Judgment: Judgment normal.         Assessment:     1. Encounter for medical examination to establish care    2. Major depressive disorder with single episode, remission status unspecified    3. Anxiety state    4. Primary hypertension  "   5. Vitamin D deficiency    6. Prediabetes    7. Hypothyroidism, unspecified type    8. Class 3 severe obesity due to excess calories without serious comorbidity with body mass index (BMI) of 40.0 to 44.9 in adult    9. Tobacco user    10. JUAN RAMON on CPAP    11. Vocal cord paralysis    12. Grief counseling    13. Hidradenitis    14. Pain, dental      Plan:   Chante was seen today for establish care.    Diagnoses and all orders for this visit:    Encounter for medical examination to establish care  Recommend annual eye exam and biannual dental exams.  Limit caffeine and alcohol intake.  Well balanced diet low in sugar/complex carbohydrates and increased vegetable intake encouraged.  Moderate intensity exercise 30 min/day at least 5 days/Wk (total 150 min/Wk) recommended.    Major depressive disorder with single episode, remission status unspecified  -     TSH; Future  Referral to psychology.  Denies SI/HI, AH/VH.  Recommend relaxation techniques and coping strategies (i.e. essential oils, deep breathing, exercise, etc).  Seek immediate medical treatment for SOB, persistent panic attack, chest pain, suicidal thoughts or hallucinations.    Anxiety state  -     TSH; Future  See instructions above.    Primary hypertension  -     CBC Auto Differential; Future  -     Comprehensive Metabolic Panel; Future  -     Lipid Panel; Future  -     Urinalysis, Reflex to Urine Culture; Future  -     Ambulatory referral/consult to Cardiology; Future  BP at goal  Continue current medications.  Low sodium diet and exercise recommended  Monitor BP at home and notify MD if sBP >160 or <90. Also notify MD if dBP >100 or <60.  Limit caffeine intake.  Seek immediate medical treatment for chest pain, SOB, LE edema, severe headache, blurred vision, dizziness, slurred speech, any new or worsening symptoms.    Vitamin D deficiency  -     Vitamin D; Future  Vitamin D level ordered and pending.   Patient will be treated appropriately based on results  of testing.  Increase Vitamin D rich food in diet.  Despite vitamin D deficiency patient should continue to wear sunscreen prior to any prolonged sun exposure.    Prediabetes  -     Hemoglobin A1C; Future  Recommend ADA diet.    Hypothyroidism, unspecified type  Continue Synthroid 75 mcg daily. Keep follow up with endocrinology, Dr. Rodriguez.  TSH / Free T4 ordered and pending.  Take medicine on an empty stomach with water (no other medications or beverages). Wait 30 minutes to eat or drink.  Report any symptoms of thinning hair, breaking nails, fatigue, weight gain or loss, palpitations.     Class 3 severe obesity due to excess calories without serious comorbidity with body mass index (BMI) of 40.0 to 44.9 in adult  -     CBC Auto Differential; Future  -     Comprehensive Metabolic Panel; Future  -     Hemoglobin A1C; Future  -     Lipid Panel; Future  See instructions above.    Tobacco user  Smoking cessation recommended and patient counseled on adverse health outcomes from tobacco use.    JUAN RAMON on CPAP  Continue daily use of CPAP.    Vocal cord paralysis  -     Ambulatory referral/consult to ENT; Future    Grief counseling  -     Ambulatory referral/consult to Psychology; Future    Hidradenitis  Stable.  Consider referral to dermatology. Denies active lesions.  Continue topical clindamycin as needed.    Pain, dental  -     amoxicillin-clavulanate 875-125mg (AUGMENTIN) 875-125 mg per tablet; Take 1 tablet by mouth every 12 (twelve) hours. for 7 days  Concern for developing abscess. Patient admits to previous hx of abscess.  Start Augmentin as prescribed.  NSAIDs as needed for pain.    Follow up: Follow up in about 6 weeks (around 8/9/2024) for Wellness Visit.    After visit summary was printed and given to patient upon discharge today.  Chante Heller was given education on their disease process and medications.

## 2024-07-01 ENCOUNTER — TELEPHONE (OUTPATIENT)
Dept: FAMILY MEDICINE | Facility: CLINIC | Age: 49
End: 2024-07-01
Payer: COMMERCIAL

## 2024-07-01 DIAGNOSIS — E03.9 HYPOTHYROIDISM, UNSPECIFIED TYPE: Primary | ICD-10-CM

## 2024-07-01 RX ORDER — LEVOTHYROXINE SODIUM 75 UG/1
75 TABLET ORAL DAILY
Qty: 30 TABLET | Refills: 0 | Status: SHIPPED | OUTPATIENT
Start: 2024-07-01

## 2024-07-01 NOTE — TELEPHONE ENCOUNTER
----- Message from Pat Maldonado sent at 7/1/2024  9:18 AM CDT -----  Regarding: med  .Type:  Needs Medical Advice    Who Called: pt  Symptoms (please be specific):    How long has patient had these symptoms:    Pharmacy name and phone #:    Would the patient rather a call back or a response via [facility]?   Best Call Back Number:  [phone]  Additional Information: med request thyroid

## 2024-07-15 ENCOUNTER — PATIENT MESSAGE (OUTPATIENT)
Dept: FAMILY MEDICINE | Facility: CLINIC | Age: 49
End: 2024-07-15
Payer: COMMERCIAL

## 2024-07-15 RX ORDER — VALSARTAN 40 MG/1
40 TABLET ORAL DAILY
Qty: 30 TABLET | Refills: 5 | Status: SHIPPED | OUTPATIENT
Start: 2024-07-15

## 2024-08-02 ENCOUNTER — LAB VISIT (OUTPATIENT)
Dept: LAB | Facility: HOSPITAL | Age: 49
End: 2024-08-02
Attending: FAMILY MEDICINE
Payer: COMMERCIAL

## 2024-08-02 DIAGNOSIS — I10 PRIMARY HYPERTENSION: ICD-10-CM

## 2024-08-02 DIAGNOSIS — E66.01 CLASS 3 SEVERE OBESITY DUE TO EXCESS CALORIES WITHOUT SERIOUS COMORBIDITY WITH BODY MASS INDEX (BMI) OF 40.0 TO 44.9 IN ADULT: ICD-10-CM

## 2024-08-02 DIAGNOSIS — R73.03 PREDIABETES: ICD-10-CM

## 2024-08-02 DIAGNOSIS — F41.1 ANXIETY STATE: ICD-10-CM

## 2024-08-02 DIAGNOSIS — E55.9 VITAMIN D DEFICIENCY: ICD-10-CM

## 2024-08-02 DIAGNOSIS — E03.9 HYPOTHYROIDISM, UNSPECIFIED TYPE: ICD-10-CM

## 2024-08-02 DIAGNOSIS — F32.9 MAJOR DEPRESSIVE DISORDER WITH SINGLE EPISODE, REMISSION STATUS UNSPECIFIED: ICD-10-CM

## 2024-08-02 LAB
25(OH)D3+25(OH)D2 SERPL-MCNC: 65 NG/ML (ref 30–80)
ALBUMIN SERPL-MCNC: 3.7 G/DL (ref 3.5–5)
ALBUMIN/GLOB SERPL: 1 RATIO (ref 1.1–2)
ALP SERPL-CCNC: 80 UNIT/L (ref 40–150)
ALT SERPL-CCNC: 13 UNIT/L (ref 0–55)
ANION GAP SERPL CALC-SCNC: 8 MEQ/L
AST SERPL-CCNC: 11 UNIT/L (ref 5–34)
BASOPHILS # BLD AUTO: 0.03 X10(3)/MCL
BASOPHILS NFR BLD AUTO: 0.5 %
BILIRUB SERPL-MCNC: 0.4 MG/DL
BILIRUB UR QL STRIP.AUTO: NEGATIVE
BUN SERPL-MCNC: 12.8 MG/DL (ref 9.8–20.1)
CALCIUM SERPL-MCNC: 9.4 MG/DL (ref 8.4–10.2)
CHLORIDE SERPL-SCNC: 108 MMOL/L (ref 98–107)
CHOLEST SERPL-MCNC: 157 MG/DL
CHOLEST/HDLC SERPL: 4 {RATIO} (ref 0–5)
CLARITY UR: CLEAR
CO2 SERPL-SCNC: 24 MMOL/L (ref 22–29)
COLOR UR AUTO: NORMAL
CREAT SERPL-MCNC: 0.92 MG/DL (ref 0.55–1.02)
CREAT/UREA NIT SERPL: 14
EOSINOPHIL # BLD AUTO: 0.19 X10(3)/MCL (ref 0–0.9)
EOSINOPHIL NFR BLD AUTO: 3.1 %
ERYTHROCYTE [DISTWIDTH] IN BLOOD BY AUTOMATED COUNT: 13.3 % (ref 11.5–17)
EST. AVERAGE GLUCOSE BLD GHB EST-MCNC: 114 MG/DL
GFR SERPLBLD CREATININE-BSD FMLA CKD-EPI: >60 ML/MIN/1.73/M2
GLOBULIN SER-MCNC: 3.6 GM/DL (ref 2.4–3.5)
GLUCOSE SERPL-MCNC: 110 MG/DL (ref 74–100)
GLUCOSE UR QL STRIP: NEGATIVE
HBA1C MFR BLD: 5.6 %
HCT VFR BLD AUTO: 38.5 % (ref 37–47)
HDLC SERPL-MCNC: 43 MG/DL (ref 35–60)
HGB BLD-MCNC: 12.5 G/DL (ref 12–16)
HGB UR QL STRIP: NEGATIVE
IMM GRANULOCYTES # BLD AUTO: 0.02 X10(3)/MCL (ref 0–0.04)
IMM GRANULOCYTES NFR BLD AUTO: 0.3 %
KETONES UR QL STRIP: NEGATIVE
LDLC SERPL CALC-MCNC: 97 MG/DL (ref 50–140)
LEUKOCYTE ESTERASE UR QL STRIP: NEGATIVE
LYMPHOCYTES # BLD AUTO: 2.72 X10(3)/MCL (ref 0.6–4.6)
LYMPHOCYTES NFR BLD AUTO: 44.2 %
MCH RBC QN AUTO: 28.7 PG (ref 27–31)
MCHC RBC AUTO-ENTMCNC: 32.5 G/DL (ref 33–36)
MCV RBC AUTO: 88.5 FL (ref 80–94)
MONOCYTES # BLD AUTO: 0.49 X10(3)/MCL (ref 0.1–1.3)
MONOCYTES NFR BLD AUTO: 8 %
NEUTROPHILS # BLD AUTO: 2.7 X10(3)/MCL (ref 2.1–9.2)
NEUTROPHILS NFR BLD AUTO: 43.9 %
NITRITE UR QL STRIP: NEGATIVE
NRBC BLD AUTO-RTO: 0 %
PH UR STRIP: 6 [PH]
PLATELET # BLD AUTO: 260 X10(3)/MCL (ref 130–400)
PMV BLD AUTO: 9.6 FL (ref 7.4–10.4)
POTASSIUM SERPL-SCNC: 4.4 MMOL/L (ref 3.5–5.1)
PROT SERPL-MCNC: 7.3 GM/DL (ref 6.4–8.3)
PROT UR QL STRIP: NEGATIVE
RBC # BLD AUTO: 4.35 X10(6)/MCL (ref 4.2–5.4)
SODIUM SERPL-SCNC: 140 MMOL/L (ref 136–145)
SP GR UR STRIP.AUTO: 1.01 (ref 1–1.03)
T4 FREE SERPL-MCNC: 1.05 NG/DL (ref 0.7–1.48)
TRIGL SERPL-MCNC: 86 MG/DL (ref 37–140)
TSH SERPL-ACNC: 2.32 UIU/ML (ref 0.35–4.94)
UROBILINOGEN UR STRIP-ACNC: 0.2
VLDLC SERPL CALC-MCNC: 17 MG/DL
WBC # BLD AUTO: 6.15 X10(3)/MCL (ref 4.5–11.5)

## 2024-08-02 PROCEDURE — 81003 URINALYSIS AUTO W/O SCOPE: CPT

## 2024-08-02 PROCEDURE — 84439 ASSAY OF FREE THYROXINE: CPT

## 2024-08-02 PROCEDURE — 83036 HEMOGLOBIN GLYCOSYLATED A1C: CPT

## 2024-08-02 PROCEDURE — 85025 COMPLETE CBC W/AUTO DIFF WBC: CPT

## 2024-08-02 PROCEDURE — 80053 COMPREHEN METABOLIC PANEL: CPT

## 2024-08-02 PROCEDURE — 80061 LIPID PANEL: CPT

## 2024-08-02 PROCEDURE — 84443 ASSAY THYROID STIM HORMONE: CPT

## 2024-08-02 PROCEDURE — 36415 COLL VENOUS BLD VENIPUNCTURE: CPT

## 2024-08-02 PROCEDURE — 82306 VITAMIN D 25 HYDROXY: CPT

## 2024-08-02 RX ORDER — LEVOTHYROXINE SODIUM 75 UG/1
75 TABLET ORAL DAILY
Qty: 30 TABLET | Refills: 0 | Status: SHIPPED | OUTPATIENT
Start: 2024-08-02

## 2024-08-21 ENCOUNTER — OFFICE VISIT (OUTPATIENT)
Dept: FAMILY MEDICINE | Facility: CLINIC | Age: 49
End: 2024-08-21
Payer: COMMERCIAL

## 2024-08-21 VITALS
WEIGHT: 259.19 LBS | HEIGHT: 62 IN | RESPIRATION RATE: 18 BRPM | OXYGEN SATURATION: 99 % | DIASTOLIC BLOOD PRESSURE: 74 MMHG | SYSTOLIC BLOOD PRESSURE: 119 MMHG | BODY MASS INDEX: 47.7 KG/M2 | HEART RATE: 65 BPM | TEMPERATURE: 99 F

## 2024-08-21 DIAGNOSIS — F41.1 ANXIETY STATE: ICD-10-CM

## 2024-08-21 DIAGNOSIS — M85.80 OSTEOPENIA, UNSPECIFIED LOCATION: ICD-10-CM

## 2024-08-21 DIAGNOSIS — G47.33 OSA ON CPAP: ICD-10-CM

## 2024-08-21 DIAGNOSIS — E55.9 VITAMIN D DEFICIENCY: ICD-10-CM

## 2024-08-21 DIAGNOSIS — I10 PRIMARY HYPERTENSION: ICD-10-CM

## 2024-08-21 DIAGNOSIS — E66.01 CLASS 3 SEVERE OBESITY DUE TO EXCESS CALORIES WITHOUT SERIOUS COMORBIDITY WITH BODY MASS INDEX (BMI) OF 40.0 TO 44.9 IN ADULT: ICD-10-CM

## 2024-08-21 DIAGNOSIS — E03.9 HYPOTHYROIDISM, UNSPECIFIED TYPE: ICD-10-CM

## 2024-08-21 DIAGNOSIS — R73.03 PREDIABETES: ICD-10-CM

## 2024-08-21 DIAGNOSIS — F32.9 MAJOR DEPRESSIVE DISORDER WITH SINGLE EPISODE, REMISSION STATUS UNSPECIFIED: ICD-10-CM

## 2024-08-21 DIAGNOSIS — N76.0 ACUTE VAGINITIS: ICD-10-CM

## 2024-08-21 DIAGNOSIS — Z00.00 ENCOUNTER FOR PREVENTATIVE ADULT HEALTH CARE EXAMINATION: Primary | ICD-10-CM

## 2024-08-21 DIAGNOSIS — Z72.0 TOBACCO USER: ICD-10-CM

## 2024-08-21 PROCEDURE — 99396 PREV VISIT EST AGE 40-64: CPT | Mod: ,,, | Performed by: FAMILY MEDICINE

## 2024-08-21 RX ORDER — FLUCONAZOLE 150 MG/1
150 TABLET ORAL DAILY
Qty: 1 TABLET | Refills: 1 | Status: SHIPPED | OUTPATIENT
Start: 2024-08-21 | End: 2024-08-22

## 2024-08-21 NOTE — PROGRESS NOTES
Patient ID: [id number]     Chief Complaint: Annual Exam (Wellness )        HPI:   Disclaimer:  This note is prepared using voice recognition software and as such is likely to have errors despite attempts at proofreading. Please contact me for questions.     Chante Heller is a 48 y.o. female here today for an annual wellness visit.   The patient admits to an intermittently healthy diet.  She states that she attempts to cook at home has however has been eating fast food lately.  She walks for exercise at least 2 times per week.  The patient states that she is attempting smoking cessation.  She states that she successfully quit for 15 days but recently restarted.  Caffeine intake consists of 1 cup of coffee per day.  Patient admits to symptoms of vaginitis consistent with previous yeast infections.  Cervical Cancer Screening - s/p [facility]  Breast Cancer Screening - [facility] 06/2024  Colon Cancer Screening - Last colonoscopy  2023  Osteoporosis Screening - DEXA last year per patient, osteopenia  Eye Exam - Last eye exam within last year per patient.  Dental Exam - Last dental visit 08/05/2024  Vaccinations -   Immunization History   Administered Date(s) Administered    COVID-19, MRNA, LN-S, PF (Pfizer) (Purple Cap) 03/16/2021, 04/06/2021    DTP 03/15/1974, 06/15/1974, 06/15/1975, 07/15/1975, 08/15/1978    Influenza - Quadrivalent 02/22/2022, 09/13/2022, 10/06/2023    Influenza - Trivalent - PF (ADULT) 09/25/2019, 09/25/2019, 01/01/2021, 02/22/2022    MMR 10/04/1974    OPV 03/15/1974, 06/15/1974, 07/15/1974, 06/15/1975    PPD Test 06/21/2013    Tdap 10/06/2023        Past Medical History:   Diagnosis Date    History of hyperparathyroidism     Hypertension     Sebaceous cyst     Thyroid disease         Past Surgical History:   Procedure Laterality Date    COLONOSCOPY N/A 06/21/2023    Procedure: COLON;  Surgeon: Anirudh Ulloa MD;  Location: [facility] ENDOSCOPY;  Service: Gastroenterology;  Laterality: N/A;     Colonoscopy (10/26/2012)      COLONOSCOPY, WITH 1 OR MORE BIOPSIES  06/21/2023    Procedure: COLONOSCOPY, WITH 1 OR MORE BIOPSIES;  Surgeon: Anirudh Ulloa MD;  Location: [facility] ENDOSCOPY;  Service: Gastroenterology;;    EXCISION OF MASS OF CHEST N/A 01/11/2023    Procedure: EXCISION, MASS, CHEST;  Surgeon: Frank Montemayor MD;  Location: [facility] OR;  Service: General;  Laterality: N/A;    EXCISION, MASS N/A 01/11/2023    Procedure: EXCISION OFSOFT TISSUE MASS LEFT UPPER BACK 2CM, SKIN NEOPLASM CHEST 1CM;  Surgeon: Frank Montemayor MD;  Location: [facility] OR;  Service: General;  Laterality: N/A;    History of total vaginal hysterectomy (TVH) (2004)      MULTIPLE TOOTH EXTRACTIONS Bilateral     2024    PARATHYROIDECTOMY      [place] teeth extracted (2008)         Review of patient's allergies indicates:   Allergen Reactions    Glycerin (adult)        Outpatient Medications Marked as Taking for the 8/21/24 encounter (Office Visit) with Geoffrey Chavez MD   Medication Sig Dispense Refill    celecoxib (CELEBREX) 200 MG capsule Take 1 capsule (200 mg total) by mouth 2 (two) times daily. 60 capsule 0    cyclobenzaprine (FLEXERIL) 10 MG tablet cyclobenzaprine Take 1 time per day No date recorded tablet 1 time per day No route recorded No set duration recorded No set duration amount recorded suspended 10 mg      L.acid/L.casei/B.bif/B.bettie/FOS (PROBIOTIC BLEND ORAL) Take 1 capsule by mouth once daily.      levothyroxine (SYNTHROID) 75 MCG tablet Take 1 tablet (75 mcg total) by mouth once daily. 30 tablet 0    linaCLOtide (LINZESS) 72 mcg Cap capsule Take 72 mcg by mouth before breakfast.      valsartan (DIOVAN) 40 MG tablet Take 1 tablet (40 mg total) by mouth once daily. 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[place], works from home   Tobacco Use    Smoking status: Some  Days     Current packs/day: 0.50     Types: Cigarettes    Smokeless tobacco: Never   Substance and Sexual Activity    Alcohol use: Not Currently    Drug use: Never        Family History   Problem Relation Name Age of Onset    Hyperlipidemia Mother      Hypertension Mother      Diabetes Mother      Asthma Mother      Hyperthyroidism Mother      Arthritis Mother      Alzheimer's disease Father      Hyperthyroidism Sister      Hypothyroidism Sister      No Known Problems Sister          Lab Results   Component Value Date    WBC 6.15 08/02/2024    HGB 12.5 08/02/2024    HCT 38.5 08/02/2024     08/02/2024    CHOL 157 08/02/2024    TRIG 86 08/02/2024    HDL 43 08/02/2024    LDLDIRECT 65.0 03/31/2023    ALT 13 08/02/2024    AST 11 08/02/2024     08/02/2024    K 4.4 08/02/2024     (H) 08/02/2024    CREATININE 0.92 08/02/2024    BUN 12.8 08/02/2024    CO2 24 08/02/2024    TSH 2.317 08/02/2024    HGBA1C 5.6 08/02/2024       Mammo Digital Screening Bilat w/ [place]   - MAMMO DIGITAL SCREENING BILAT WITH [place]    BILATERAL DIGITAL SCREENING MAMMOGRAM 3D/2D WITH CAD: 6/21/2024    HISTORY: Routine screening. Patient has no complaints.      COMPARISONS: Comparison is made to exams dated:  6/15/2023 mammogram and 9/7/2016 mammogram - [facility] [place].      TECHNIQUE: Digital mammography views were performed with tomosynthesis. Current study was evaluated with a Computer Aided Detection (CAD) system.     BREAST COMPOSITION: There are scattered areas of fibroglandular tissue.      FINDINGS:   No significant masses, calcifications, or other findings are seen in either breast.    There has been no significant interval change.    IMPRESSION: NEGATIVE    There is no mammographic evidence of malignancy.     RECOMMENDATIONS:   A routine screening mammogram in one year in the absence of significant clinical findings in the interval is recommended (June 2025).      Yomi Katz M.D.           "  bruno/parminder:6/21/2024 15:12:15      letter sent: Mammography Normal    Mammogram BI-RADS: 1 Negative       Subjective:     Review of Systems:   Review of Systems   Constitutional:  Negative for chills, diaphoresis and fever.   Eyes:  Negative for blurred vision and double vision.   Respiratory:  Negative for cough and shortness of breath.    Cardiovascular:  Negative for chest pain and leg swelling.   Gastrointestinal:  Negative for abdominal pain, diarrhea, nausea and vomiting.   Skin:  Negative for rash.   Neurological:  Negative for dizziness, tremors and headaches.        See HPI for details    Objective:     /74 (BP Location: Right arm, Patient Position: Sitting)   Pulse 65   Temp 98.6 °F (37 °C) (Oral)   Resp 18   Ht 5' 2" (1.575 m)   Wt 117.6 kg (259 lb 3.2 oz)   SpO2 99%   BMI 47.41 kg/m²     Physical Exam:  Physical Exam  Constitutional:       General: She is not in acute distress.     Appearance: She is not toxic-appearing or diaphoretic.   HENT:      Head: Normocephalic and atraumatic.      Right Ear: Tympanic membrane, ear canal and external ear normal. There is no impacted cerumen.      Left Ear: Tympanic membrane, ear canal and external ear normal. There is no impacted cerumen.      Nose: Nose normal.      Mouth/Throat:      Mouth: Mucous membranes are moist.      Pharynx: Oropharynx is clear. No oropharyngeal exudate or posterior oropharyngeal erythema.   Eyes:      General: No scleral icterus.     Extraocular Movements: Extraocular movements intact.      Conjunctiva/sclera: Conjunctivae normal.   Cardiovascular:      Rate and Rhythm: Normal rate and regular rhythm.      Heart sounds: Normal heart sounds. No murmur heard.     No friction rub. No gallop.   Pulmonary:      Effort: Pulmonary effort is normal. No respiratory distress.      Breath sounds: Normal breath sounds. No stridor. No wheezing, rhonchi or rales.   Musculoskeletal:         General: Normal range of motion.      Cervical " back: Normal range of motion and neck supple. No rigidity.   Lymphadenopathy:      Cervical: No cervical adenopathy.   Skin:     General: Skin is warm and dry.      Coloration: Skin is not pale.   Neurological:      General: No focal deficit present.      Mental Status: She is alert and oriented to person, place, and time. Mental status is at baseline.   Psychiatric:         Mood and Affect: Mood normal.         Behavior: Behavior normal.         Thought Content: Thought content normal.         Judgment: Judgment normal.         Assessment:       ICD-10-CM ICD-9-CM   1. Encounter for preventative adult health care examination  Z00.00 V70.0   2. Primary hypertension  I10 401.9   3. Major depressive disorder with single episode, remission status unspecified  F32.9 296.20   4. Anxiety state  F41.1 300.00   5. Prediabetes  R73.03 790.29   6. Hypothyroidism, unspecified type  E03.9 244.9   7. Vitamin D deficiency  E55.9 268.9   8. Class 3 severe obesity due to excess calories without serious comorbidity with body mass index (BMI) of 40.0 to 44.9 in adult  E66.01 278.01    Z68.41 V85.41   9. Osteopenia, unspecified location  M85.80 733.90   10. JUAN RAMON on CPAP  G47.33 327.23   11. Tobacco user  Z72.0 305.1   12. Acute vaginitis  N76.0 616.10        Plan:       Health Maintenance Topics with due status: Not Due       Topic Last Completion Date    Colorectal Cancer Screening 06/21/2023    TETANUS VACCINE 10/06/2023    Influenza Vaccine 10/06/2023    Mammogram 06/21/2024    Hemoglobin A1c (Prediabetes) 08/02/2024    Lipid Panel 08/02/2024        1. Encounter for preventative adult health care examination  Recommend annual eye exam and biannual dental exams.  Limit caffeine and alcohol intake.  Smoking cessation recommended and patient counseled on adverse health outcomes from tobacco use.  Well balanced diet low in sugar/complex carbohydrates and increased vegetable intake encouraged.  Moderate intensity exercise 30 min/day at  least 5 days/Wk (total 150 min/Wk) recommended.  Maintain healthy BMI which may include weight loss.  Wellness labs reviewed in clinic.  See above preventative health screening at today's visit.    2. Primary hypertension  BP at goal  Continue current medications.  Low sodium diet and exercise recommended  Monitor BP at home and notify MD if sBP >160 or <90. Also notify MD if dBP >100 or <60.  Limit caffeine intake.  Seek immediate medical treatment for chest pain, SOB, LE edema, severe headache, blurred vision, dizziness, slurred speech, any new or worsening symptoms.    3. Major depressive disorder with single episode, remission status unspecified  Stable   Denies SI/HI, AH/VH.  Recommend relaxation techniques and coping strategies (i.e. essential oils, deep breathing, exercise, etc).  Seek immediate medical treatment for SOB, persistent panic attack, chest pain, suicidal thoughts or hallucinations.    4. Anxiety state  See #3.    5. Prediabetes  Resolved.  HbA1c 5.6.  Continue ADA diet and exercise.    6. Hypothyroidism, unspecified type  Continue Synthroid 75 mcg.  TSH / Free T4 WNL.  Take medicine on an empty stomach with water (no other medications or beverages). Wait 30 minutes to eat or drink.  Report any symptoms of thinning hair, breaking nails, fatigue, weight gain or loss, palpitations.     7. Vitamin D deficiency  Improved. Vitamin D level 65.  Continue maintenance therapy with vitamin D3 1,000-2,000 IU daily.    8. Class 3 severe obesity due to excess calories without serious comorbidity with body mass index (BMI) of 40.0 to 44.9 in adult  See #1.    9. Osteopenia, unspecified location  Recommend OTC calcium/Vitamin D supplement such as Oscal-D or Caltrate.   May also discuss befits and side effects of starting bisphosphonate such as Fosamax if with PCP if no improvement on repeat DEXA.   Repeat DEXA due: 2025  Weight bearing exercises also encouraged.    10. JUAN RAMON on CPAP  Continue CPAP nightly.    11.  Tobacco user  See #1.    12. Acute vaginitis  - fluconazole (DIFLUCAN) 150 MG Tab; Take 1 tablet (150 mg total) by mouth once daily. for 1 day  Dispense: 1 tablet; Refill: 1      Follow up in about 6 months (around 2/21/2025) for chronic conditions.

## 2024-08-31 DIAGNOSIS — E03.9 HYPOTHYROIDISM, UNSPECIFIED TYPE: ICD-10-CM

## 2024-09-03 ENCOUNTER — PATIENT MESSAGE (OUTPATIENT)
Dept: FAMILY MEDICINE | Facility: CLINIC | Age: 49
End: 2024-09-03
Payer: COMMERCIAL

## 2024-09-03 RX ORDER — LEVOTHYROXINE SODIUM 75 UG/1
75 TABLET ORAL
Qty: 30 TABLET | Refills: 6 | Status: SHIPPED | OUTPATIENT
Start: 2024-09-03

## 2024-09-03 RX ORDER — LEVOTHYROXINE SODIUM 75 UG/1
TABLET ORAL
Qty: 30 TABLET | Refills: 0 | Status: SHIPPED | OUTPATIENT
Start: 2024-09-03 | End: 2024-09-03

## 2024-09-24 ENCOUNTER — TELEPHONE (OUTPATIENT)
Dept: INTERNAL MEDICINE | Facility: CLINIC | Age: 49
End: 2024-09-24
Payer: COMMERCIAL

## 2024-09-24 NOTE — TELEPHONE ENCOUNTER
----- Message from Jackelyn Nagel LPN sent at 9/24/2024  8:15 AM CDT -----  Regarding: MELISSA Puente 10/1/24 @0900  Are there any outstanding tasks in the chart? New patient    Is there any documentation of tasks? no    Please tell patient to bring living will, power of , or advance directive document to visit if they have it.

## 2024-09-30 DIAGNOSIS — E03.9 HYPOTHYROIDISM, UNSPECIFIED TYPE: ICD-10-CM

## 2024-09-30 RX ORDER — LEVOTHYROXINE SODIUM 75 UG/1
TABLET ORAL
Qty: 30 TABLET | Refills: 6 | OUTPATIENT
Start: 2024-09-30

## 2024-10-01 ENCOUNTER — OFFICE VISIT (OUTPATIENT)
Dept: INTERNAL MEDICINE | Facility: CLINIC | Age: 49
End: 2024-10-01
Payer: COMMERCIAL

## 2024-10-01 VITALS
DIASTOLIC BLOOD PRESSURE: 72 MMHG | HEIGHT: 62 IN | BODY MASS INDEX: 48.21 KG/M2 | OXYGEN SATURATION: 98 % | HEART RATE: 61 BPM | RESPIRATION RATE: 18 BRPM | SYSTOLIC BLOOD PRESSURE: 122 MMHG | WEIGHT: 262 LBS

## 2024-10-01 DIAGNOSIS — L73.2 HYDRADENITIS: ICD-10-CM

## 2024-10-01 DIAGNOSIS — E66.01 MORBID OBESITY: ICD-10-CM

## 2024-10-01 DIAGNOSIS — L73.2 HIDRADENITIS SUPPURATIVA: ICD-10-CM

## 2024-10-01 DIAGNOSIS — M25.512 ACUTE PAIN OF LEFT SHOULDER: ICD-10-CM

## 2024-10-01 DIAGNOSIS — Z72.0 TOBACCO USER: Primary | ICD-10-CM

## 2024-10-01 DIAGNOSIS — Z86.39 HISTORY OF HYPERPARATHYROIDISM: ICD-10-CM

## 2024-10-01 DIAGNOSIS — R73.03 PREDIABETES: ICD-10-CM

## 2024-10-01 DIAGNOSIS — E04.1 THYROID NODULE: ICD-10-CM

## 2024-10-01 DIAGNOSIS — Z23 FLU VACCINE NEED: ICD-10-CM

## 2024-10-01 DIAGNOSIS — L02.91 ABSCESS: ICD-10-CM

## 2024-10-01 DIAGNOSIS — I10 PRIMARY HYPERTENSION: ICD-10-CM

## 2024-10-01 DIAGNOSIS — R22.2 MASS OF SUBCUTANEOUS TISSUE OF BACK: ICD-10-CM

## 2024-10-01 DIAGNOSIS — E03.9 HYPOTHYROIDISM, UNSPECIFIED TYPE: ICD-10-CM

## 2024-10-01 PROBLEM — M25.519 SHOULDER PAIN: Status: ACTIVE | Noted: 2024-10-01

## 2024-10-01 PROBLEM — F32.9 MAJOR DEPRESSIVE DISORDER WITH SINGLE EPISODE: Status: RESOLVED | Noted: 2022-11-18 | Resolved: 2024-10-01

## 2024-10-01 PROCEDURE — 99205 OFFICE O/P NEW HI 60 MIN: CPT | Mod: ,,, | Performed by: INTERNAL MEDICINE

## 2024-10-01 RX ORDER — MUPIROCIN 20 MG/G
OINTMENT TOPICAL 3 TIMES DAILY
Qty: 30 G | Refills: 2 | Status: SHIPPED | OUTPATIENT
Start: 2024-10-01

## 2024-10-01 RX ORDER — DOXYCYCLINE 100 MG/1
100 CAPSULE ORAL 2 TIMES DAILY
Qty: 14 CAPSULE | Refills: 0 | Status: SHIPPED | OUTPATIENT
Start: 2024-10-01 | End: 2024-10-08

## 2024-10-01 RX ORDER — LEVOTHYROXINE SODIUM 75 UG/1
75 TABLET ORAL
Qty: 90 TABLET | Refills: 3 | Status: SHIPPED | OUTPATIENT
Start: 2024-10-01

## 2024-10-01 RX ORDER — ACETAMINOPHEN 500 MG
TABLET ORAL DAILY
COMMUNITY

## 2024-10-01 NOTE — TELEPHONE ENCOUNTER
----- Message from Bony sent at 9/30/2024 11:06 AM CDT -----  .Type:  Needs Medical Advice    Who Called: Chante   Symptoms (please be specific):    How long has patient had these symptoms:    Pharmacy name and phone #:    Would the patient rather a call back or a response via [facility]?   Best Call Back Number: [phone]  Additional Information: Patient called wanted to speak with the nurse about her medications. Please call her back.

## 2024-10-01 NOTE — ASSESSMENT & PLAN NOTE
A regular exercise program including walking and strength training and a calorie restriced diet was recommended.

## 2024-10-01 NOTE — PROGRESS NOTES
Subjective:      Chief Complaint: Establish Care (C/o L shoulder pain X1mo )      HPI:She is here to [facility].  She was having some BP issues.  She is taking Valsartan for about a year.  She checks it at home with a wrist cuff periodically.   She has a h/o prediabetes in the past.  She does have some blurry vision intermittently.  She odes sleep with a cpap.  She has a vocal cord issues -- vocal cord isn't sitting correctly -- gets sob very easily.  She was referred to cardiology for mild aortic atherosclerosis.  She had an echo and stress test, CT calcium score (which was 0), and a venous u/s.  She was told the echo was normal.  The venous u/s whowed a leaky valve. She had started walking for exercise -- but she choked on some water and she had not been exercising reccently.  She does have a h/o some situational depression/anxiety more so.  She doesn't take anything for that.  She had thyroid surgery a couple of years ago.  She also feels like she has a brain fog since she had Covid.  She has smoked off and on.  She says she smokes a 1/2 ppd.  Shehad previously quit for 25 days, but then restarted.  She is having some issues with recurrent abscesses -- possible hydradenitis suppuritiva -- in axilla and groin.  She's had several I&Ds since she was [place]    Problem Noted   Thyroid Nodule 10/1/2024    This is followed by Dr. Jernigan  Was never biopsied.     History of Hyperparathyroidism 10/1/2024   Abscess 10/1/2024   Hydradenitis 10/1/2024   Shoulder Pain 10/1/2024   Hypertension    Tobacco User 11/18/2022   Hypothyroidism 9/13/2022    She is followed by Dr. Jernigan       Morbid Obesity 9/13/2022   Mass of Subcutaneous Tissue of Back (Resolved) 1/11/2023    Was removed by Dr. Montemayor, was some kind of wart.     Major Depressive Disorder With Single Episode (Resolved) 11/18/2022        The patient's Health Maintenance was reviewed and the following appears to be due:   Health Maintenance Due   Topic Date Due     Pneumococcal Vaccines (Age 0-64) (1 of 2 - PCV) Never done    Shingles Vaccine (1 of 2) Never done    Influenza Vaccine (1) 09/01/2024    COVID-19 Vaccine (3 - 2024-25 season) 09/01/2024       Past Medical History:  Past Medical History:   Diagnosis Date    History of hyperparathyroidism     Hypertension     Major depressive disorder with single episode 11/18/2022    Mass of subcutaneous tissue of back 01/11/2023    Was removed by Dr. Montemayor, was some kind of wart.      Sebaceous cyst     Thyroid disease      Review of patient's allergies indicates:   Allergen Reactions    Glycerin (adult)      Current Outpatient Medications on File Prior to Visit   Medication Sig Dispense Refill    celecoxib (CELEBREX) 200 MG capsule Take 1 capsule (200 mg total) by mouth 2 (two) times daily. 60 capsule 0    cholecalciferol, vitamin D3, (VITAMIN D3) 50 mcg (2,000 unit) Cap capsule Take by mouth once daily.      L.acid/L.casei/B.bif/B.bettie/FOS (PROBIOTIC BLEND ORAL) Take 1 capsule by mouth once daily.      linaCLOtide (LINZESS) 145 mcg Cap capsule Take 1 capsule by mouth every morning.      valsartan (DIOVAN) 40 MG tablet Take 1 tablet (40 mg total) by mouth once daily. 30 tablet 5    [DISCONTINUED] levothyroxine (SYNTHROID) 75 MCG tablet Take 1 tablet (75 mcg total) by mouth before breakfast. 30 tablet 6    [DISCONTINUED] cyclobenzaprine (FLEXERIL) 10 MG tablet cyclobenzaprine Take 1 time per day No date recorded tablet 1 time per day No route recorded No set duration recorded No set duration amount recorded suspended 10 mg      [DISCONTINUED] linaCLOtide (LINZESS) 72 mcg Cap capsule Take 72 mcg by mouth before breakfast.       No current facility-administered medications on file prior to visit.       Review of Systems   Constitutional:  Negative for fatigue.        Some brain fog and occ has trouble remembering certain words    Doesn't sleep as well as she used to since she started the cpap.   HENT:          Vocal cord  "dysfunction     Eyes:         Wears glasses, occ blurry   Respiratory:  Positive for shortness of breath. Negative for cough.    Cardiovascular: Negative.    Gastrointestinal:  Positive for constipation (takes Linzess).   Genitourinary: Negative.    Musculoskeletal:  Positive for arthralgias (some shoulder pain for about a month, worse with movement).   Skin:         Recurrent abscesses     Neurological: Negative.        Objective:   /72 (BP Location: Right arm, Patient Position: Sitting)   Pulse 61   Resp 18   Ht 5' 2.01" (1.575 m)   Wt 118.8 kg (262 lb)   SpO2 98%   BMI 47.91 kg/m²     Physical Exam  Constitutional:       General: She is not in acute distress.     Appearance: Normal appearance.   HENT:      Head: Normocephalic and atraumatic.   Eyes:      General: No scleral icterus.     Conjunctiva/sclera: Conjunctivae normal.   Neck:      Vascular: No carotid bruit.   Cardiovascular:      Rate and Rhythm: Normal rate and regular rhythm.      Pulses: Normal pulses.      Heart sounds: Normal heart sounds. No murmur heard.     No friction rub. No gallop.   Pulmonary:      Effort: Pulmonary effort is normal.      Breath sounds: Normal breath sounds.   Abdominal:      General: Bowel sounds are normal.      Palpations: Abdomen is soft. There is no mass.      Tenderness: There is no abdominal tenderness. There is no guarding or rebound.   Musculoskeletal:         General: Tenderness (left lateral shoulder over deltoid inserton area) present. No deformity.      Cervical back: No rigidity or tenderness.      Right lower leg: No edema.      Left lower leg: No edema.      Comments: ROM of left shoulder seems generally intact.   Lymphadenopathy:      Cervical: No cervical adenopathy.   Skin:     Coloration: Skin is not jaundiced or pale.      Findings: No erythema.      Comments: Approx 1 cm fluctuant abscess left breast   Neurological:      General: No focal deficit present.      Mental Status: She is alert and " oriented to person, place, and time.      Gait: Gait normal.   Psychiatric:         Mood and Affect: Mood normal.         Behavior: Behavior normal.         Thought Content: Thought content normal.         Judgment: Judgment normal.       Assessment and Plan:     Abscess  Rx for doxy, mupirocin, call if not resolving.    Hydradenitis  Refer to derm for eval.  I also rec she could try skin decolonization with hibiclens baths and mupirocin.    Hypothyroidism  Levels at goal in August.  Continue levothyroxine 75 mcg daily.  Rx sent    Hypertension  Controlled.  Continue valsartan    Tobacco user  She is agreeable to referral to the smoking cessation program.    Morbid obesity  A regular exercise program including walking and strength training and a calorie restriced diet was recommended.    Shoulder pain  I rec rest, ice, celebrex bid for 7 days and avoid aggravating factors to see if that helps resolve the pain.  Time spent with patient was 60 minutes given the multiple co-morbidities and addresing of chronic and acute issues.    Follow up in about 4 months (around 2/1/2025).    Medications Ordered This Encounter   Medications    doxycycline (VIBRAMYCIN) 100 MG Cap     Sig: Take 1 capsule (100 mg total) by mouth 2 (two) times daily. for 7 days     Dispense:  14 capsule     Refill:  0    levothyroxine (SYNTHROID) 75 MCG tablet     Sig: Take 1 tablet (75 mcg total) by mouth before breakfast.     Dispense:  90 tablet     Refill:  3    mupirocin (BACTROBAN) 2 % ointment     Sig: Apply topically 3 (three) times daily. Apply tid prn skin infection and bid to bilateral nares for 5 days     Dispense:  30 g     Refill:  2     [unfilled]  Orders Placed This Encounter   Procedures    Hemoglobin A1C     Standing Status:   Future     Standing Expiration Date:   4/1/2026    Ambulatory referral/consult to Smoking Cessation Program     Standing Status:   Future     Standing Expiration Date:   11/1/2025     Referral Priority:    Routine     Referral Type:   Consultation     Referral Reason:   Specialty Services Required     Requested Specialty:   CTTS     Number of Visits Requested:   1    Ambulatory referral/consult to Dermatology     Standing Status:   Future     Standing Expiration Date:   11/1/2025     Referral Priority:   Routine     Referral Type:   Consultation     Referral Reason:   Specialty Services Required     Requested Specialty:   Dermatology     Number of Visits Requested:   1

## 2024-10-01 NOTE — ASSESSMENT & PLAN NOTE
I rec rest, ice, celebrex bid for 7 days and avoid aggravating factors to see if that helps resolve the pain.

## 2024-10-08 ENCOUNTER — CLINICAL SUPPORT (OUTPATIENT)
Dept: SMOKING CESSATION | Facility: CLINIC | Age: 49
End: 2024-10-08

## 2024-10-08 DIAGNOSIS — F17.200 NICOTINE DEPENDENCE: Primary | ICD-10-CM

## 2024-10-08 RX ORDER — IBUPROFEN 200 MG
1 TABLET ORAL DAILY
Qty: 28 PATCH | Refills: 0 | Status: SHIPPED | OUTPATIENT
Start: 2024-10-08

## 2024-10-08 RX ORDER — DM/P-EPHED/ACETAMINOPH/DOXYLAM 30-7.5/3
2 LIQUID (ML) ORAL
Qty: 144 LOZENGE | Refills: 0 | Status: SHIPPED | OUTPATIENT
Start: 2024-10-08

## 2024-10-09 NOTE — PROGRESS NOTES
Patient will be participating in biweekly tobacco cessation meetings and will begin the prescribed tobacco cessation medication regimen of  21 mg nicotine patch QD and 2 mg nicotine lozenge PRN (1-2 per hour in place of cigarettes). Patient currently smokes 10 cigarettes per day.  FTND score of 3 indicates a moderate level of nicotine dependence, CLAUDIA-D score of 10 perceived as no degree of mental distress /possible  depression. Discussed and reviewed with patient the role of tobacco cessation program, role of nicotine replacement therapy  (NRT), medication along with behavioral therapy & counseling to assist the patient to reach her goal of being tobacco free. Education and instruction on the role of the NRT, usage, proper placement of the patch and possible side effects. Reviewed behavioral modification strategy of rate reduction and wait time of 15 min prior to smoking. Patient verbalized understanding and willingness to use patch. Patient instructed to call me with any questions or concerns.

## 2024-10-09 NOTE — PROGRESS NOTES
Individual Follow-Up Form    10/8/2024    Quit Date: ***    Clinical Status of Patient: Outpatient    Length of Service: {Smoking Length of Service:12891}    Continuing Medication: {Smoking Cessation Yes/No with medication:97734}    Other Medications: ***     Target Symptoms: Withdrawal and medication side effects. The following were  rated moderate (3) to severe (4) on TCRS:  Moderate (3): ***  Severe (4): ***    Comments: ***    Diagnosis: {Smoking Cessation Diagnosis:09205}    Next Visit: {Smoking Cessation Next Phone Intervention:00430}

## 2024-11-06 ENCOUNTER — CLINICAL SUPPORT (OUTPATIENT)
Dept: SMOKING CESSATION | Facility: CLINIC | Age: 49
End: 2024-11-06

## 2024-11-06 DIAGNOSIS — F17.200 NICOTINE DEPENDENCE: Primary | ICD-10-CM

## 2024-11-06 PROCEDURE — 99404 PREV MED CNSL INDIV APPRX 60: CPT | Mod: 95,,,

## 2024-11-07 NOTE — PROGRESS NOTES
Individual Follow-Up Form    11/6/2024    Quit Date:     Clinical Status of Patient: Outpatient    Length of Service: 60 minutes    Continuing Medication: yes  Patches or Nicotine Lozenges    Other Medications: None     Target Symptoms: Withdrawal and medication side effects. The following were  rated moderate (3) to severe (4) on TCRS:  Moderate (3): Urges/ Cravings  Severe (4): None    Comments: Spoke with patient via virtual visit this afternoon in regards to smoking cessation progress update. Patient is currently still smoking 10 cigarettes per day. Pt has not started tobacco cessation medication of 21 mg nicotine patch QD and 2 mg nicotine lozenge PRN (1-2 per hour in place of cigarettes.)  Pt stated she is little to start patches. Discussed the importance of wearing the patches. Pt is not doing well with rate reduction and wait times prior to smoking. Pt encouraged to pick a quit day. Reviewed coping strategies/habitual behavior/relapse prevention with patient. Reviewed learned addiction model, personal reasons for quitting, medications, goals, quit date. The patient denies any abnormal behavioral or mental changes at this time.      Diagnosis: F17.200    Next Visit: 2 weeks

## 2024-12-19 ENCOUNTER — TELEPHONE (OUTPATIENT)
Dept: SMOKING CESSATION | Facility: CLINIC | Age: 49
End: 2024-12-19
Payer: COMMERCIAL

## 2024-12-19 NOTE — TELEPHONE ENCOUNTER
Called pt back regarding message left about rescheduling appt. No answer. Left message with contact info.

## 2025-01-24 ENCOUNTER — TELEPHONE (OUTPATIENT)
Dept: INTERNAL MEDICINE | Facility: CLINIC | Age: 50
End: 2025-01-24
Payer: COMMERCIAL

## 2025-01-24 DIAGNOSIS — I10 PRIMARY HYPERTENSION: Primary | ICD-10-CM

## 2025-01-24 RX ORDER — VALSARTAN 40 MG/1
40 TABLET ORAL DAILY
Qty: 30 TABLET | Refills: 5 | Status: SHIPPED | OUTPATIENT
Start: 2025-01-24

## 2025-01-24 NOTE — TELEPHONE ENCOUNTER
----- Message from Cornelio sent at 1/23/2025  1:10 PM CST -----  Who Called: Carla Heller    Patient is returning phone call    Who Left Message for Patient:  Does the patient know what this is regarding?:      Preferred Method of Contact: Phone Call  Patient's Preferred Phone Number on File: [phone]   Best Call Back Number, if different:  Additional Information: pt called stated need meds from previous pcp refilled valsartan 40mg @[address] [place] [address] pls advise

## 2025-01-29 ENCOUNTER — TELEPHONE (OUTPATIENT)
Dept: INTERNAL MEDICINE | Facility: CLINIC | Age: 50
End: 2025-01-29
Payer: COMMERCIAL

## 2025-01-29 NOTE — TELEPHONE ENCOUNTER
----- Message from Nurse Hayden sent at 1/28/2025  8:28 AM CST -----  Regarding: MELISSA Puente 2/4/25 @10:20a  Are there any outstanding tasks in the chart? Patient will need nonfasting labs    Is there any documentation of tasks? no    Please tell patient to bring living will, power of , or advance directive document to visit if they have it.

## 2025-02-04 ENCOUNTER — LAB VISIT (OUTPATIENT)
Dept: LAB | Facility: HOSPITAL | Age: 50
End: 2025-02-04
Attending: INTERNAL MEDICINE
Payer: COMMERCIAL

## 2025-02-04 DIAGNOSIS — R73.03 PREDIABETES: ICD-10-CM

## 2025-02-04 LAB
EST. AVERAGE GLUCOSE BLD GHB EST-MCNC: 122.6 MG/DL
HBA1C MFR BLD: 5.9 %

## 2025-02-04 PROCEDURE — 36415 COLL VENOUS BLD VENIPUNCTURE: CPT

## 2025-02-04 PROCEDURE — 83036 HEMOGLOBIN GLYCOSYLATED A1C: CPT

## 2025-02-06 ENCOUNTER — TELEPHONE (OUTPATIENT)
Dept: SMOKING CESSATION | Facility: CLINIC | Age: 50
End: 2025-02-06
Payer: COMMERCIAL

## 2025-02-06 ENCOUNTER — OFFICE VISIT (OUTPATIENT)
Dept: INTERNAL MEDICINE | Facility: CLINIC | Age: 50
End: 2025-02-06
Payer: COMMERCIAL

## 2025-02-06 VITALS
WEIGHT: 262 LBS | HEART RATE: 67 BPM | SYSTOLIC BLOOD PRESSURE: 132 MMHG | HEIGHT: 62 IN | OXYGEN SATURATION: 100 % | RESPIRATION RATE: 18 BRPM | BODY MASS INDEX: 48.21 KG/M2 | DIASTOLIC BLOOD PRESSURE: 80 MMHG

## 2025-02-06 DIAGNOSIS — L73.2 HYDRADENITIS: ICD-10-CM

## 2025-02-06 DIAGNOSIS — E66.01 MORBID OBESITY: ICD-10-CM

## 2025-02-06 DIAGNOSIS — F17.210 CIGARETTE NICOTINE DEPENDENCE WITHOUT COMPLICATION: ICD-10-CM

## 2025-02-06 DIAGNOSIS — Z72.0 TOBACCO USER: Primary | ICD-10-CM

## 2025-02-06 DIAGNOSIS — M25.50 ARTHRALGIA, UNSPECIFIED JOINT: ICD-10-CM

## 2025-02-06 DIAGNOSIS — G47.00 INSOMNIA, UNSPECIFIED TYPE: ICD-10-CM

## 2025-02-06 PROCEDURE — 4010F ACE/ARB THERAPY RXD/TAKEN: CPT | Mod: CPTII,,, | Performed by: INTERNAL MEDICINE

## 2025-02-06 PROCEDURE — 3079F DIAST BP 80-89 MM HG: CPT | Mod: CPTII,,, | Performed by: INTERNAL MEDICINE

## 2025-02-06 PROCEDURE — 3008F BODY MASS INDEX DOCD: CPT | Mod: CPTII,,, | Performed by: INTERNAL MEDICINE

## 2025-02-06 PROCEDURE — 99214 OFFICE O/P EST MOD 30 MIN: CPT | Mod: ,,, | Performed by: INTERNAL MEDICINE

## 2025-02-06 PROCEDURE — 1159F MED LIST DOCD IN RCRD: CPT | Mod: CPTII,,, | Performed by: INTERNAL MEDICINE

## 2025-02-06 PROCEDURE — 3075F SYST BP GE 130 - 139MM HG: CPT | Mod: CPTII,,, | Performed by: INTERNAL MEDICINE

## 2025-02-06 PROCEDURE — 1160F RVW MEDS BY RX/DR IN RCRD: CPT | Mod: CPTII,,, | Performed by: INTERNAL MEDICINE

## 2025-02-06 PROCEDURE — 3044F HG A1C LEVEL LT 7.0%: CPT | Mod: CPTII,,, | Performed by: INTERNAL MEDICINE

## 2025-02-06 RX ORDER — CELECOXIB 200 MG/1
200 CAPSULE ORAL 2 TIMES DAILY PRN
Qty: 60 CAPSULE | Refills: 3 | Status: SHIPPED | OUTPATIENT
Start: 2025-02-06

## 2025-02-06 RX ORDER — OMEPRAZOLE 40 MG/1
40 CAPSULE, DELAYED RELEASE ORAL DAILY
COMMUNITY

## 2025-02-06 RX ORDER — TRAZODONE HYDROCHLORIDE 50 MG/1
50 TABLET ORAL NIGHTLY
Qty: 30 TABLET | Refills: 11 | Status: SHIPPED | OUTPATIENT
Start: 2025-02-06 | End: 2026-02-06

## 2025-02-06 RX ORDER — IBUPROFEN 200 MG
1 TABLET ORAL DAILY
Qty: 28 PATCH | Refills: 1 | Status: SHIPPED | OUTPATIENT
Start: 2025-02-06

## 2025-02-06 RX ORDER — CYCLOSPORINE 0.5 MG/ML
1 EMULSION OPHTHALMIC 2 TIMES DAILY
COMMUNITY

## 2025-02-06 NOTE — ASSESSMENT & PLAN NOTE
I'm getting her back in touch with the smoking cessation person and I refilled her nicoderm patches.

## 2025-02-06 NOTE — PROGRESS NOTES
Subjective:      Chief Complaint: Follow-up (4mo/Discuss labs /Would like refill for celebrex- she takes it daily for pain )      HPI:  History of Present Illness    CHIEF COMPLAINT:  Patient presents today for follow-up.    OCULAR:  She has a recently discovered small cataract in her right eye without associated symptoms. She reports worsening dry eye syndrome, currently being treated with Restasis.    MUSCULOSKELETAL:  She reports pain in multiple areas including legs, knees, hip, back, and buttocks. She has moderate osteoarthritis in both hips and knees. She is not currently taking Celebrex due to pending prescription renewal.    SLEEP:  She has difficulty maintaining sleep, typically sleeping for 3-4 hours at a time. ZzzQuil has been effective, while melatonin has not provided relief. She uses CPAP regularly but reports current issues with mucus production interfering with CPAP use following a viral/sinus illness around [place].    SMOKING HISTORY:  She initially had success with Nicoderm 21mg patches for smoking cessation, reporting no morning cigarette cravings. However, she resumed smoking 3-4 weeks after discontinuing patches. Nicotine lozenges have not been effective for smoking cessation.    SURGICAL HISTORY:  Hysterectomy in [id number].    FAMILY HISTORY:  Mother and sister experienced menopause at age 52.    DIET:  She reports poor eating habits with infrequent consumption of bread (once monthly), and no rice, soda, or sandwiches. She acknowledges occasional sweet cravings that significantly impact her diet. She particularly enjoys potatoes but excludes sweet potatoes.    LABS:  A1C increased from 5.6 to 5.9.      ROS:  ROS as indicated in HPI.         Problem Noted   Arthralgia 2/6/2025   Insomnia 2/6/2025   Thyroid Nodule 10/1/2024    This is followed by Dr. Jernigan  Was never biopsied.     History of Hyperparathyroidism 10/1/2024   Abscess 10/1/2024   Hydradenitis 10/1/2024   Shoulder Pain 10/1/2024    Hypertension    Tobacco User 11/18/2022   Hypothyroidism 9/13/2022    She is followed by Dr. Jernigan       Morbid Obesity 9/13/2022   Mass of Subcutaneous Tissue of Back (Resolved) 1/11/2023    Was removed by Dr. Montemayor, was some kind of wart.     Major Depressive Disorder With Single Episode (Resolved) 11/18/2022        The patient's Health Maintenance was reviewed and the following appears to be due:   Health Maintenance Due   Topic Date Due    Pneumococcal Vaccines (Age 50+) (1 of 2 - PCV) Never done    Shingles Vaccine (1 of 2) Never done    COVID-19 Vaccine (3 - 2024-25 season) 09/01/2024       Past Medical History:  Past Medical History:   Diagnosis Date    GERD (gastroesophageal reflux disease)     History of hyperparathyroidism     Hypertension     Hyperthyroidism     Major depressive disorder with single episode 11/18/2022    Mass of subcutaneous tissue of back 01/11/2023    Was removed by Dr. Montemayor, was some kind of wart.      Sebaceous cyst     Thyroid disease      Review of patient's allergies indicates:   Allergen Reactions    Glycerin     Glycerin (adult)      Current Outpatient Medications on File Prior to Visit   Medication Sig Dispense Refill    cholecalciferol, vitamin D3, (VITAMIN D3) 50 mcg (2,000 unit) Cap capsule Take by mouth once daily.      cycloSPORINE (RESTASIS) 0.05 % ophthalmic emulsion 1 drop 2 (two) times daily.      L.acid/L.casei/B.bif/B.bettie/FOS (PROBIOTIC BLEND ORAL) Take 1 capsule by mouth once daily.      levothyroxine (SYNTHROID) 75 MCG tablet Take 1 tablet (75 mcg total) by mouth before breakfast. 90 tablet 3    linaCLOtide (LINZESS) 145 mcg Cap capsule Take 1 capsule by mouth every morning.      mupirocin (BACTROBAN) 2 % ointment Apply topically 3 (three) times daily. Apply tid prn skin infection and bid to bilateral nares for 5 days 30 g 2    nicotine polacrilex 2 MG Lozg Take 1 lozenge (2 mg total) by mouth as needed (do not exceed 10 pieces per day.). 144 lozenge 0  "   omeprazole (PRILOSEC) 40 MG capsule Take 40 mg by mouth once daily.      valsartan (DIOVAN) 40 MG tablet Take 1 tablet (40 mg total) by mouth once daily. 30 tablet 5    [DISCONTINUED] celecoxib (CELEBREX) 200 MG capsule Take 1 capsule (200 mg total) by mouth 2 (two) times daily. 60 capsule 0    [DISCONTINUED] nicotine (NICODERM CQ) 21 mg/24 hr Place 1 patch onto the skin once daily. (Patient not taking: Reported on 2/6/2025) 28 patch 0     No current facility-administered medications on file prior to visit.       Review of Systems    Objective:   /80 (BP Location: Right arm, Patient Position: Sitting)   Pulse 67   Resp 18   Ht 5' 2.01" (1.575 m)   Wt 118.8 kg (262 lb)   SpO2 100%   BMI 47.91 kg/m²     Physical Exam  Vitals reviewed.   Constitutional:       General: She is not in acute distress.     Appearance: Normal appearance. She is not ill-appearing or diaphoretic.   HENT:      Head: Normocephalic and atraumatic.   Pulmonary:      Effort: Pulmonary effort is normal.   Skin:     General: Skin is warm and dry.   Neurological:      General: No focal deficit present.      Mental Status: She is alert.   Psychiatric:         Mood and Affect: Mood normal.         Behavior: Behavior normal.         Thought Content: Thought content normal.         Judgment: Judgment normal.       Assessment and Plan:     Tobacco user  I'm getting her back in touch with the smoking cessation person and I refilled her nicoderm patches.    Hydradenitis  She hasn't yet shceduled her appt with derm.    Morbid obesity  We discussed a 2000 calorie restriction and 20 minutes of exercise before work.  She will work on this.    Arthralgia  Rx for prn celebrex.    Insomnia  Trial of trazodone.      Follow up in about 3 months (around 5/6/2025).    Medications Ordered This Encounter   Medications    celecoxib (CELEBREX) 200 MG capsule     Sig: Take 1 capsule (200 mg total) by mouth 2 (two) times daily as needed for Pain.     Dispense: "  60 capsule     Refill:  3    nicotine (NICODERM CQ) 21 mg/24 hr     Sig: Place 1 patch onto the skin once daily.     Dispense:  28 patch     Refill:  1     Address Verified. Please mail to patient.    traZODone (DESYREL) 50 MG tablet     Sig: Take 1 tablet (50 mg total) by mouth every evening.     Dispense:  30 tablet     Refill:  11     [unfilled]  No orders of the defined types were placed in this encounter.    This note was generated with the assistance of ambient listening technology. Verbal consent was obtained by the patient and accompanying visitor(s) for the recording of patient appointment to facilitate this note. I attest to having reviewed and edited the generated note for accuracy, though some syntax or spelling errors may persist. Please contact the author of this note for any clarification.

## 2025-02-06 NOTE — TELEPHONE ENCOUNTER
Called pt regarding smoking cessation program. No answer. Left voice message with contact information.

## 2025-02-06 NOTE — ASSESSMENT & PLAN NOTE
We discussed a 2000 calorie restriction and 20 minutes of exercise before work.  She will work on this.

## 2025-02-13 ENCOUNTER — TELEPHONE (OUTPATIENT)
Dept: SMOKING CESSATION | Facility: CLINIC | Age: 50
End: 2025-02-13
Payer: COMMERCIAL

## 2025-02-18 ENCOUNTER — CLINICAL SUPPORT (OUTPATIENT)
Dept: SMOKING CESSATION | Facility: CLINIC | Age: 50
End: 2025-02-18

## 2025-02-18 DIAGNOSIS — F17.200 NICOTINE DEPENDENCE: Primary | ICD-10-CM

## 2025-02-18 NOTE — PROGRESS NOTES
Spoke with patient today in regard to smoking cessation progress for 3 month telephone follow up, she states not tobacco free. Patient has a scheduled appointment to return to the program for Quit #2. Informed patient of benefit period, future follow ups, and contact information if any further help or support is needed. Will resolve episode and complete smart form for Quit attempt #1.

## 2025-02-19 ENCOUNTER — CLINICAL SUPPORT (OUTPATIENT)
Dept: SMOKING CESSATION | Facility: CLINIC | Age: 50
End: 2025-02-19

## 2025-02-19 DIAGNOSIS — F17.200 NICOTINE DEPENDENCE: Primary | ICD-10-CM

## 2025-02-19 RX ORDER — MICONAZOLE NITRATE 2 %
2 CREAM (GRAM) TOPICAL
Qty: 170 EACH | Refills: 0 | Status: SHIPPED | OUTPATIENT
Start: 2025-02-19

## 2025-02-20 NOTE — PROGRESS NOTES
Individual Follow-Up Form    2/19/2025    Quit Date:     Clinical Status of Patient: Outpatient    Length of Service: 60 minutes    Continuing Medication: yes  {Smoking Cessation Medications:57528:o}    Other Medications: ***     Target Symptoms: Withdrawal and medication side effects. The following were  rated moderate (3) to severe (4) on TCRS:  Moderate (3): ***  Severe (4): ***    Comments: ***    Diagnosis: {Smoking Cessation Diagnosis:13742}    Next Visit: {Smoking Cessation Next Phone Intervention:11427}

## 2025-02-20 NOTE — PROGRESS NOTES
2nd quit- Virtual appt.  Patient will be participating in biweekly tobacco cessation meetings and will begin the prescribed tobacco cessation medication regimen of  21 mg nicotine patch QD and 2 mg nicotine gum PRN (1-2 per hour in place of cigarettes). Patient currently smokes 5-10 cigarettes per day.  FTND score of 2 indicates a low level of nicotine dependence, CLAUDIA-D score of 2  perceived as no degree of mental distress /probable depression. Pt will work on making vehicle smoke free. Pt will also keep a log of the amount of cigarettes she consumes in 1 day. Discussed and reviewed with patient the role of tobacco cessation program, role of nicotine replacement therapy  (NRT), medication along with behavioral therapy & counseling to assist the patient to reach her goal of being tobacco free. Education and instruction on the role of the NRT, usage, proper placement of the patch and possible side effects. Reviewed behavioral modification strategy of rate reduction and wait time of 15 min prior to smoking. Patient verbalized understanding and willingness to use patch. Patient instructed to call me with any questions or concerns.

## 2025-03-05 ENCOUNTER — CLINICAL SUPPORT (OUTPATIENT)
Dept: SMOKING CESSATION | Facility: CLINIC | Age: 50
End: 2025-03-05
Payer: COMMERCIAL

## 2025-03-05 DIAGNOSIS — F17.200 NICOTINE DEPENDENCE: Primary | ICD-10-CM

## 2025-03-05 DIAGNOSIS — F17.210 CIGARETTE NICOTINE DEPENDENCE WITHOUT COMPLICATION: ICD-10-CM

## 2025-03-05 RX ORDER — IBUPROFEN 200 MG
1 TABLET ORAL DAILY
Qty: 28 PATCH | Refills: 0 | Status: SHIPPED | OUTPATIENT
Start: 2025-03-05

## 2025-03-05 NOTE — PROGRESS NOTES
Individual Follow-Up Form    3/5/2025    Quit Date:     Clinical Status of Patient: Outpatient    Length of Service: 60 minutes    Continuing Medication: yes  Patches or Nicotine gum    Other Medications: None     Target Symptoms: Withdrawal and medication side effects. The following were  rated moderate (3) to severe (4) on TCRS:  Moderate (3): Urges/ Cravings  Severe (4):     Comments: Spoke with patient via virtual this afternoon in regards to smoking cessation progress update. Patient is currently smoking 6-7 cigarettes per day. Pt stated she did not smoke any cigarettes today because she does not have any available. Pt will try her best to not purchase any more cigarettes. Pt remains on tobacco cessation medication of 21 mg nicotine patch QD and 2 mg nicotine gum PRN (1-2 per hour in place of cigarettes.) No adverse effects noted at this time. Will reorder patches today.  Pt is working on rate reduction and wait times prior to smoking. Pt encouraged to pick a quit day. Recommended quitting today since pt has not had a cigarette today. Reviewed coping strategies/habitual behavior/relapse prevention with patient. Reviewed learned addiction model, personal reasons for quitting, medications, goals, quit date. The patient denies any abnormal behavioral or mental changes at this time.      Diagnosis: F17.200    Next Visit: 2 weeks

## 2025-04-02 ENCOUNTER — CLINICAL SUPPORT (OUTPATIENT)
Dept: SMOKING CESSATION | Facility: CLINIC | Age: 50
End: 2025-04-02

## 2025-04-02 DIAGNOSIS — F17.200 NICOTINE DEPENDENCE: Primary | ICD-10-CM

## 2025-04-02 NOTE — PROGRESS NOTES
Individual Follow-Up Form    4/2/2025    Quit Date: 3/30/25    Clinical Status of Patient: Outpatient    Length of Service: 60 minutes    Continuing Medication: yes  Patches or Nicotine Lozenges    Other Medications: None     Target Symptoms: Withdrawal and medication side effects. The following were  rated moderate (3) to severe (4) on TCRS:  Moderate (3): Urges  Severe (4): None    Comments:  Spoke with patient via virtual this afternoon in regards to smoking cessation progress update. Patient is currently 3 days smoke free. Pt remains on tobacco cessation medication of 21 mg nicotine patch QD and 2 mg nicotine lozenge  PRN (1-2 per hour in place of cigarettes.) No adverse effects noted at this time. Pt doing well with quit. Reviewed coping strategies/habitual behavior/relapse prevention with patient. Reviewed strategies, habitual behavior, high risks situations, understanding urges and cravings, stress and relaxation with open discussion and additional interventions, Introduced lapses, relapses, understanding them and analyzing the situation of a lapse, conflict issues that may be linked to a lapse. The patient denies any abnormal behavioral or mental changes at this time.    Diagnosis: F17.200    Next Visit: 2 weeks

## 2025-04-14 DIAGNOSIS — F17.200 NICOTINE DEPENDENCE: ICD-10-CM

## 2025-04-14 RX ORDER — IBUPROFEN 200 MG
1 TABLET ORAL DAILY
Qty: 28 PATCH | Refills: 0 | Status: SHIPPED | OUTPATIENT
Start: 2025-04-14

## 2025-04-17 ENCOUNTER — CLINICAL SUPPORT (OUTPATIENT)
Dept: SMOKING CESSATION | Facility: CLINIC | Age: 50
End: 2025-04-17

## 2025-04-17 DIAGNOSIS — F17.200 NICOTINE DEPENDENCE: Primary | ICD-10-CM

## 2025-04-17 NOTE — PROGRESS NOTES
Individual Follow-Up Form    4/17/2025    Quit Date:     Clinical Status of Patient: Outpatient    Length of Service: 60 minutes    Continuing Medication: yes  Patches and Gum    Other Medications: None     Target Symptoms: Withdrawal and medication side effects. The following were  rated moderate (3) to severe (4) on TCRS:  Moderate (3): Urges/ Cravings  Severe (4): None    Comments: Spoke with patient this afternoon in regards to smoking cessation progress update. Patient is currently smoking () cigarettes per day. Pt remains on tobacco cessation medication of 21 mg nicotine patch QD and 2 mg nicotine gum PRN (1-2 per hour in place of cigarettes.) No adverse effects noted at this time. Pt stated she had a slip last night due to being overwhelmed and stress in her work and personal life. Pt is in the grieving process now. Pt will focus on working on self to help her stay smoke free. Pt doing well with rate reduction and wait times prior to smoking. Pt purchase a pack of cigarettes last night. She smoked 1 cigarette and throw the pack away. Congratulate pt on throwing the cigarettes away. Reviewed strategies, habitual behavior, high risks situations, understanding urges and cravings, stress and relaxation with open discussion and additional interventions, lapses, relapses. Unable to check carbon monoxide level due to virtual visit.  The patient denies any abnormal behavioral or mental changes at this time.    Diagnosis: F17.200    Next Visit: 2 weeks

## 2025-04-21 ENCOUNTER — CLINICAL SUPPORT (OUTPATIENT)
Dept: SMOKING CESSATION | Facility: CLINIC | Age: 50
End: 2025-04-21

## 2025-04-21 DIAGNOSIS — F17.200 NICOTINE DEPENDENCE: Primary | ICD-10-CM

## 2025-04-21 PROCEDURE — 99406 BEHAV CHNG SMOKING 3-10 MIN: CPT | Mod: ,,,

## 2025-04-21 PROCEDURE — 99999 PR PBB SHADOW E&M-EST. PATIENT-LVL I: CPT | Mod: PBBFAC,,,

## 2025-04-21 NOTE — PROGRESS NOTES
Spoke with patient today in regard to smoking cessation progress for 6 month phone follow up on Quit 1.  Patient is tobacco free at this time but stated that she continues to work on her Quit. Commended patient on their progress thus far.  Patient currently enrolled in program for Quit 2 and has scheduled an appointment with her CTTS.  Patient [facility] Iv[facility]. Informed patient of benefit period, future follow ups, and contact information if any further help or support is needed. Will complete and resolve smart form for 6/12 month follow up on Quit attempt # 1.

## 2025-05-01 ENCOUNTER — CLINICAL SUPPORT (OUTPATIENT)
Dept: SMOKING CESSATION | Facility: CLINIC | Age: 50
End: 2025-05-01
Payer: COMMERCIAL

## 2025-05-01 DIAGNOSIS — F17.200 NICOTINE DEPENDENCE: Primary | ICD-10-CM

## 2025-05-01 NOTE — PROGRESS NOTES
Individual Follow-Up Form    5/1/2025    Quit Date:     Clinical Status of Patient: Outpatient    Length of Service: 60 minutes    Continuing Medication: Yes, Nicotine patches, nicotine gum    Other Medications: None     Target Symptoms: Withdrawal and medication side effects. The following were  rated moderate (3) to severe (4) on TCRS:  Moderate (3): Urges/ Cravings  Severe (4): None    Comments: Spoke with patient this afternoon in regards to smoking cessation progress update. Patient is currently smoking 2 cigarettes per day. Pt remains on tobacco cessation medication of 21 mg nicotine patch QD and 2 mg nicotine gum PRN (1-2 per hour in place of cigarettes.) No adverse effects noted at this time. Pt stated she had a slip on Saturday asking her sister for a cigarette while having a drink. Pt did not smoke on Sunday but purchase a pack of cigarettes on Monday morning. Pt stated she started smoke two cigarettes per day since Monday. Pt will restart and throw cigarettes away. Pt is not doing well with rate reduction and wait times prior to smoking. Reviewed coping strategies/habitual behavior/relapse prevention with patient. Reviewed strategies, controlling environment, cues, triggers, new goals set. Introduced high risk situations with preparation interventions, caffeine similarities with withdrawal issues of habit and nicotine, Alcohol, Understanding urges, cravings, stress and relaxation. Open discussion with intervention discussion. Unable to check carbon monoxide level due to phone visit. The patient denies any abnormal behavioral or mental changes at this time.      Diagnosis: F17.200    Next Visit: 2 weeks

## 2025-05-15 ENCOUNTER — CLINICAL SUPPORT (OUTPATIENT)
Dept: SMOKING CESSATION | Facility: CLINIC | Age: 50
End: 2025-05-15
Payer: COMMERCIAL

## 2025-05-15 DIAGNOSIS — F17.200 NICOTINE DEPENDENCE: Primary | ICD-10-CM

## 2025-05-15 PROCEDURE — 99407 BEHAV CHNG SMOKING > 10 MIN: CPT | Mod: S$GLB,,,

## 2025-05-15 NOTE — PROGRESS NOTES
Individual Follow-Up Form    5/15/2025    Quit Date: ***    Clinical Status of Patient: Outpatient    Length of Service: {Smoking Length of Service:80631}    Continuing Medication: {Smoking Cessation Yes/No with medication:35292}    Other Medications: ***     Target Symptoms: Withdrawal and medication side effects. The following were  rated moderate (3) to severe (4) on TCRS:  Moderate (3): ***  Severe (4): ***    Comments: ***    Diagnosis: {Smoking Cessation Diagnosis:14582}    Next Visit: {Smoking Cessation Next Phone Intervention:38804}

## 2025-05-22 ENCOUNTER — OFFICE VISIT (OUTPATIENT)
Dept: INTERNAL MEDICINE | Facility: CLINIC | Age: 50
End: 2025-05-22
Payer: COMMERCIAL

## 2025-05-22 ENCOUNTER — CLINICAL SUPPORT (OUTPATIENT)
Dept: SMOKING CESSATION | Facility: CLINIC | Age: 50
End: 2025-05-22

## 2025-05-22 VITALS
SYSTOLIC BLOOD PRESSURE: 130 MMHG | HEART RATE: 73 BPM | WEIGHT: 275 LBS | BODY MASS INDEX: 50.61 KG/M2 | HEIGHT: 62 IN | OXYGEN SATURATION: 100 % | DIASTOLIC BLOOD PRESSURE: 74 MMHG | RESPIRATION RATE: 18 BRPM

## 2025-05-22 DIAGNOSIS — E66.01 MORBID OBESITY: ICD-10-CM

## 2025-05-22 DIAGNOSIS — G47.33 OSA ON CPAP: ICD-10-CM

## 2025-05-22 DIAGNOSIS — Z12.39 ENCOUNTER FOR SCREENING FOR MALIGNANT NEOPLASM OF BREAST, UNSPECIFIED SCREENING MODALITY: Primary | ICD-10-CM

## 2025-05-22 DIAGNOSIS — E03.9 HYPOTHYROIDISM, UNSPECIFIED TYPE: ICD-10-CM

## 2025-05-22 DIAGNOSIS — Z72.0 TOBACCO USER: ICD-10-CM

## 2025-05-22 DIAGNOSIS — F17.200 NICOTINE DEPENDENCE: Primary | ICD-10-CM

## 2025-05-22 DIAGNOSIS — R73.03 PREDIABETES: ICD-10-CM

## 2025-05-22 DIAGNOSIS — I10 PRIMARY HYPERTENSION: ICD-10-CM

## 2025-05-22 DIAGNOSIS — G47.00 INSOMNIA, UNSPECIFIED TYPE: ICD-10-CM

## 2025-05-22 PROCEDURE — 3008F BODY MASS INDEX DOCD: CPT | Mod: CPTII,,, | Performed by: INTERNAL MEDICINE

## 2025-05-22 PROCEDURE — 3044F HG A1C LEVEL LT 7.0%: CPT | Mod: CPTII,,, | Performed by: INTERNAL MEDICINE

## 2025-05-22 PROCEDURE — 4010F ACE/ARB THERAPY RXD/TAKEN: CPT | Mod: CPTII,,, | Performed by: INTERNAL MEDICINE

## 2025-05-22 PROCEDURE — 1159F MED LIST DOCD IN RCRD: CPT | Mod: CPTII,,, | Performed by: INTERNAL MEDICINE

## 2025-05-22 PROCEDURE — 3078F DIAST BP <80 MM HG: CPT | Mod: CPTII,,, | Performed by: INTERNAL MEDICINE

## 2025-05-22 PROCEDURE — 3075F SYST BP GE 130 - 139MM HG: CPT | Mod: CPTII,,, | Performed by: INTERNAL MEDICINE

## 2025-05-22 PROCEDURE — 99214 OFFICE O/P EST MOD 30 MIN: CPT | Mod: ,,, | Performed by: INTERNAL MEDICINE

## 2025-05-22 PROCEDURE — 1160F RVW MEDS BY RX/DR IN RCRD: CPT | Mod: CPTII,,, | Performed by: INTERNAL MEDICINE

## 2025-05-22 RX ORDER — IBUPROFEN 200 MG
1 TABLET ORAL DAILY
Qty: 28 PATCH | Refills: 0 | Status: SHIPPED | OUTPATIENT
Start: 2025-05-22

## 2025-05-22 NOTE — PROGRESS NOTES
Subjective:      Chief Complaint: Follow-up (3mo/)      HPI:She is here for f/u tobacco use, insomnia, obesity.  She is doing the smoking cessation program.  She was able to quit for a short time, but she is still trying.  She thinks her difficulty is related to stress and using the cigarettes to cope.  Overall she is smoking less.  She is using patches and gum.    She occ gets palpiations.  She feels its related to anxiety.    She is thinking about getting a walking pad for exercise.  But its hard to coordinate because she is working 14 hours.    The full dose of trazodone was too  much and it made her heart race, but the half dose didn't work.    Problem Noted   Arthralgia 2/6/2025   Insomnia 2/6/2025   Thyroid Nodule 10/1/2024    This is followed by Dr. Jernigan  Was never biopsied.     History of Hyperparathyroidism 10/1/2024   Abscess 10/1/2024   Hydradenitis 10/1/2024   Shoulder Pain 10/1/2024   Hypertension    Jason On Cpap 4/6/2023   Tobacco User 11/18/2022   Hypothyroidism 9/13/2022    She is followed by Dr. Jernigan       Prediabetes 9/13/2022   Morbid Obesity 9/13/2022   Mass of Subcutaneous Tissue of Back (Resolved) 1/11/2023    Was removed by Dr. Montemayor, was some kind of wart.     Major Depressive Disorder With Single Episode (Resolved) 11/18/2022        The patient's Health Maintenance was reviewed and the following appears to be due:   Health Maintenance Due   Topic Date Due    Pneumococcal Vaccines (Age 50+) (1 of 2 - PCV) Never done    Shingles Vaccine (1 of 2) Never done    COVID-19 Vaccine (3 - 2024-25 season) 09/01/2024    Mammogram  06/21/2025       Past Medical History:  Past Medical History:   Diagnosis Date    GERD (gastroesophageal reflux disease)     History of hyperparathyroidism     Hypertension     Hyperthyroidism     Major depressive disorder with single episode 11/18/2022    Mass of subcutaneous tissue of back 01/11/2023    Was removed by Dr. Montemayor, was some kind of wart.       This RN called patient to remind him of his appt with Dr Jens Patel on 8/3 for low testosterone. No answer. Left message. Note, this RN needs copy of the lab. Awaiting call back. "Sebaceous cyst     Thyroid disease      Review of patient's allergies indicates:   Allergen Reactions    Glycerin     Glycerin (adult)      Medications Ordered Prior to Encounter[1]    Review of Systems    Objective:   /74 (BP Location: Right arm, Patient Position: Sitting)   Pulse 73   Resp 18   Ht 5' 2.01" (1.575 m)   Wt 124.7 kg (275 lb)   SpO2 100%   BMI 50.29 kg/m²     Physical Exam  Vitals reviewed.   Constitutional:       General: She is not in acute distress.     Appearance: Normal appearance. She is not ill-appearing or diaphoretic.   HENT:      Head: Normocephalic and atraumatic.   Pulmonary:      Effort: Pulmonary effort is normal.   Skin:     General: Skin is warm and dry.   Neurological:      General: No focal deficit present.      Mental Status: She is alert.   Psychiatric:         Mood and Affect: Mood normal.         Behavior: Behavior normal.         Thought Content: Thought content normal.         Judgment: Judgment normal.       Assessment and Plan:     Tobacco user  She is working with the tobacco cessation program and is smoking less.    Prediabetes  I rec the meal planner with my fitness pal to help get some weight off.    Hypertension  Controlled.  Continue valsartan    Insomnia  She didn't like the trazodone but doesn't want to try anything else right now.    Morbid obesity  She needs to work on her work life balance.    JUAN RAMON on CPAP  She is compliant with her cpap.      Follow up in about 6 months (around 11/22/2025).       [unfilled]  Orders Placed This Encounter   Procedures    Mammo Digital Screening Bilat w/ Rick (XPD)     Standing Status:   Future     Expected Date:   6/22/2025     Expiration Date:   5/22/2027     May the Radiologist modify the order per protocol to meet the clinical needs of the patient?:   Yes     Release to patient:   Immediate    CBC Auto Differential     Standing Status:   Future     Expected Date:   11/22/2025     Expiration Date:   7/21/2026    " Comprehensive Metabolic Panel     Standing Status:   Future     Expected Date:   11/22/2025     Expiration Date:   7/21/2026    Lipid Panel     Standing Status:   Future     Expected Date:   11/22/2025     Expiration Date:   11/22/2026    Hemoglobin A1C     Standing Status:   Future     Expected Date:   11/22/2025     Expiration Date:   7/21/2026    TSH     Standing Status:   Future     Expected Date:   11/22/2025     Expiration Date:   7/21/2026    T4, Free     Standing Status:   Future     Expected Date:   11/22/2025     Expiration Date:   7/21/2026            [1]   Current Outpatient Medications on File Prior to Visit   Medication Sig Dispense Refill    celecoxib (CELEBREX) 200 MG capsule Take 1 capsule (200 mg total) by mouth 2 (two) times daily as needed for Pain. 60 capsule 3    cholecalciferol, vitamin D3, (VITAMIN D3) 50 mcg (2,000 unit) Cap capsule Take by mouth once daily.      cycloSPORINE (RESTASIS) 0.05 % ophthalmic emulsion 1 drop 2 (two) times daily.      L.acid/L.casei/B.bif/B.bettie/FOS (PROBIOTIC BLEND ORAL) Take 1 capsule by mouth once daily.      levothyroxine (SYNTHROID) 75 MCG tablet Take 1 tablet (75 mcg total) by mouth before breakfast. 90 tablet 3    linaCLOtide (LINZESS) 145 mcg Cap capsule Take 1 capsule by mouth every morning.      mupirocin (BACTROBAN) 2 % ointment Apply topically 3 (three) times daily. Apply tid prn skin infection and bid to bilateral nares for 5 days 30 g 2    nicotine (NICODERM CQ) 21 mg/24 hr Place 1 patch onto the skin once daily. 28 patch 1    nicotine (NICODERM CQ) 21 mg/24 hr Place 1 patch onto the skin once daily. 28 patch 0    nicotine polacrilex 2 MG Lozg Take 1 lozenge (2 mg total) by mouth as needed (do not exceed 10 pieces per day.). 144 lozenge 0    nicotine, polacrilex, (NICORETTE) 2 mg Gum Take 1 each (2 mg total) by mouth as needed (Do not exceed 10 pieces per day.). 170 each 0    omeprazole (PRILOSEC) 40 MG capsule Take 40 mg by mouth once daily.       traZODone (DESYREL) 50 MG tablet Take 1 tablet (50 mg total) by mouth every evening. 30 tablet 11    valsartan (DIOVAN) 40 MG tablet Take 1 tablet (40 mg total) by mouth once daily. 30 tablet 5     No current facility-administered medications on file prior to visit.

## 2025-05-22 NOTE — PROGRESS NOTES
Individual Follow-Up Form    5/22/2025    Quit Date:     Clinical Status of Patient: Outpatient    Length of Service: 60 minutes    Continuing Medication: yes  Patches and nicotine gum    Other Medications: None     Target Symptoms: Withdrawal and medication side effects. The following were  rated moderate (3) to severe (4) on TCRS:  Moderate (3): Urges/ Cravings  Severe (4):     Comments: Spoke with patient this afternoon in regards to smoking cessation progress update. Patient is currently smoking 1-7 cigarettes per day. Pt remains on tobacco cessation medication of 21 mg nicotine patch QD and 2 mg nicotine gum PRN (1-2 per hour in place of cigarettes.) No adverse effects noted at this time. Pt stated she started smoking more cigarettes. Pt is not smoking in vehicle.  Pt is not doing well with rate reduction and wait times prior to smoking. Pt tries strategies but never stick with them. Reviewed strategies, habitual behavior, stress, and high risk situations. Introduced stress with addition interventions, SOLVE, relaxation with interventions, nutrition, exercise, weight gain, and the importance of rewarding oneself for accomplishments toward becoming tobacco free. Open discussion of all items with interventions. Unable to check carbon monoxide level due to virtual visit. The patient denies any abnormal behavioral or mental changes at this time.      Diagnosis: F17.200    Next Visit: 2 weeks

## 2025-05-24 DIAGNOSIS — I10 PRIMARY HYPERTENSION: ICD-10-CM

## 2025-05-26 RX ORDER — VALSARTAN 40 MG/1
40 TABLET ORAL
Qty: 90 TABLET | Refills: 3 | Status: SHIPPED | OUTPATIENT
Start: 2025-05-26

## 2025-06-16 ENCOUNTER — TELEPHONE (OUTPATIENT)
Dept: SMOKING CESSATION | Facility: CLINIC | Age: 50
End: 2025-06-16
Payer: COMMERCIAL

## 2025-06-18 ENCOUNTER — TELEPHONE (OUTPATIENT)
Dept: SMOKING CESSATION | Facility: CLINIC | Age: 50
End: 2025-06-18
Payer: COMMERCIAL

## 2025-06-23 ENCOUNTER — HOSPITAL ENCOUNTER (OUTPATIENT)
Dept: RADIOLOGY | Facility: HOSPITAL | Age: 50
Discharge: HOME | End: 2025-06-23
Attending: INTERNAL MEDICINE
Payer: COMMERCIAL

## 2025-06-23 DIAGNOSIS — Z12.39 ENCOUNTER FOR SCREENING FOR MALIGNANT NEOPLASM OF BREAST, UNSPECIFIED SCREENING MODALITY: ICD-10-CM

## 2025-06-23 DIAGNOSIS — M25.50 ARTHRALGIA, UNSPECIFIED JOINT: Primary | ICD-10-CM

## 2025-06-23 PROCEDURE — 77067 SCR MAMMO BI INCL CAD: CPT | Mod: TC

## 2025-06-23 PROCEDURE — 77067 SCR MAMMO BI INCL CAD: CPT | Mod: 26,,, | Performed by: RADIOLOGY

## 2025-06-23 PROCEDURE — 77063 BREAST TOMOSYNTHESIS BI: CPT | Mod: 26,,, | Performed by: RADIOLOGY

## 2025-06-23 RX ORDER — CELECOXIB 200 MG/1
CAPSULE ORAL
Qty: 60 CAPSULE | Refills: 3 | Status: SHIPPED | OUTPATIENT
Start: 2025-06-23

## 2025-07-27 DIAGNOSIS — I10 PRIMARY HYPERTENSION: ICD-10-CM

## 2025-07-28 RX ORDER — VALSARTAN 40 MG/1
40 TABLET ORAL
Qty: 90 TABLET | Refills: 3 | Status: SHIPPED | OUTPATIENT
Start: 2025-07-28

## 2025-08-12 ENCOUNTER — TELEPHONE (OUTPATIENT)
Dept: SMOKING CESSATION | Facility: CLINIC | Age: 50
End: 2025-08-12
Payer: COMMERCIAL

## 2025-08-27 ENCOUNTER — TELEPHONE (OUTPATIENT)
Dept: SMOKING CESSATION | Facility: CLINIC | Age: 50
End: 2025-08-27
Payer: COMMERCIAL

## (undated) DEVICE — BLOCK BLOX BITE DENT RIM 54FR

## (undated) DEVICE — KIT SURGICAL COLON .25 1.1OZ

## (undated) DEVICE — KIT CANIST SUCTION 1200CC

## (undated) DEVICE — FORCEP ALLIGATOR 2.8MM W/NDL

## (undated) DEVICE — SOL IRRI STRL WATER 1000ML

## (undated) DEVICE — CONTAINER SPEC STRL PATH 4OZ

## (undated) DEVICE — COLLECTION SPECIMEN NEPTUNE

## (undated) DEVICE — ADAPTER DUAL NSL LUER M-M 7FT

## (undated) DEVICE — UNDERPAD DISPOSABLE 30X30IN

## (undated) DEVICE — TIP SUCTION YANKAUER

## (undated) DEVICE — BAG LABGUARD BIOHAZARD 6X9IN